# Patient Record
Sex: MALE | Race: WHITE | Employment: OTHER | URBAN - METROPOLITAN AREA
[De-identification: names, ages, dates, MRNs, and addresses within clinical notes are randomized per-mention and may not be internally consistent; named-entity substitution may affect disease eponyms.]

---

## 2018-10-17 ENCOUNTER — OFFICE VISIT (OUTPATIENT)
Dept: INTERNAL MEDICINE CLINIC | Age: 83
End: 2018-10-17

## 2018-10-17 VITALS
BODY MASS INDEX: 31.34 KG/M2 | WEIGHT: 206.8 LBS | HEIGHT: 68 IN | TEMPERATURE: 97.7 F | RESPIRATION RATE: 17 BRPM | DIASTOLIC BLOOD PRESSURE: 80 MMHG | HEART RATE: 51 BPM | SYSTOLIC BLOOD PRESSURE: 130 MMHG | OXYGEN SATURATION: 96 %

## 2018-10-17 DIAGNOSIS — J44.9 CHRONIC OBSTRUCTIVE PULMONARY DISEASE, UNSPECIFIED COPD TYPE (HCC): ICD-10-CM

## 2018-10-17 DIAGNOSIS — F32.0 CURRENT MILD EPISODE OF MAJOR DEPRESSIVE DISORDER WITHOUT PRIOR EPISODE (HCC): ICD-10-CM

## 2018-10-17 DIAGNOSIS — Z87.442 HISTORY OF KIDNEY STONES: ICD-10-CM

## 2018-10-17 DIAGNOSIS — F51.01 PRIMARY INSOMNIA: ICD-10-CM

## 2018-10-17 DIAGNOSIS — Z76.89 ENCOUNTER TO ESTABLISH CARE: Primary | ICD-10-CM

## 2018-10-17 DIAGNOSIS — Z86.79 HISTORY OF ATRIAL FIBRILLATION: ICD-10-CM

## 2018-10-17 DIAGNOSIS — Z87.438 HISTORY OF BPH: ICD-10-CM

## 2018-10-17 DIAGNOSIS — N18.30 STAGE 3 CHRONIC KIDNEY DISEASE (HCC): ICD-10-CM

## 2018-10-17 RX ORDER — TAMSULOSIN HYDROCHLORIDE 0.4 MG/1
0.4 CAPSULE ORAL DAILY
COMMUNITY
End: 2019-01-02 | Stop reason: SDUPTHER

## 2018-10-17 RX ORDER — ALPRAZOLAM 0.5 MG/1
0.5 TABLET ORAL
Qty: 30 TAB | Refills: 1 | OUTPATIENT
Start: 2018-10-17 | End: 2019-05-20

## 2018-10-17 RX ORDER — TRAZODONE HYDROCHLORIDE 100 MG/1
100 TABLET ORAL
Qty: 30 TAB | Refills: 1 | Status: SHIPPED | OUTPATIENT
Start: 2018-10-17 | End: 2019-01-02 | Stop reason: SDUPTHER

## 2018-10-17 RX ORDER — VENLAFAXINE 75 MG/1
25 TABLET ORAL 3 TIMES DAILY
COMMUNITY
End: 2018-10-17 | Stop reason: SDUPTHER

## 2018-10-17 RX ORDER — METOPROLOL SUCCINATE 25 MG/1
12.5 TABLET, EXTENDED RELEASE ORAL DAILY
Qty: 45 TAB | Refills: 0 | Status: SHIPPED | OUTPATIENT
Start: 2018-10-17 | End: 2019-01-02 | Stop reason: SDUPTHER

## 2018-10-17 RX ORDER — TRAZODONE HYDROCHLORIDE 100 MG/1
100 TABLET ORAL
COMMUNITY
End: 2018-10-17 | Stop reason: SDUPTHER

## 2018-10-17 RX ORDER — VENLAFAXINE 75 MG/1
75 TABLET ORAL 2 TIMES DAILY
Qty: 180 TAB | Refills: 0 | Status: SHIPPED | OUTPATIENT
Start: 2018-10-17 | End: 2019-01-02 | Stop reason: SDUPTHER

## 2018-10-17 RX ORDER — ALPRAZOLAM 0.5 MG/1
TABLET ORAL
COMMUNITY
End: 2018-10-17 | Stop reason: SDUPTHER

## 2018-10-17 RX ORDER — ASPIRIN 81 MG/1
TABLET ORAL DAILY
COMMUNITY
End: 2019-10-20

## 2018-10-17 RX ORDER — CHOLECALCIFEROL (VITAMIN D3) 125 MCG
10 CAPSULE ORAL
COMMUNITY

## 2018-10-17 RX ORDER — METOPROLOL SUCCINATE 25 MG/1
TABLET, EXTENDED RELEASE ORAL DAILY
COMMUNITY
End: 2018-10-17 | Stop reason: SDUPTHER

## 2018-10-17 RX ORDER — ALPRAZOLAM 0.5 MG/1
0.5 TABLET ORAL
Qty: 30 TAB | Refills: 1 | Status: SHIPPED | OUTPATIENT
Start: 2018-10-17 | End: 2018-10-17 | Stop reason: SDUPTHER

## 2018-10-17 RX ORDER — MULTIVITAMIN WITH IRON
1 TABLET ORAL DAILY
COMMUNITY

## 2018-10-17 NOTE — PROGRESS NOTES
New Patient Evaluation Felicitas Scott is a 81 y.o. male. They are here to establish care with the group and me as a primary care provider. He has recently moved from [place], [place]. His wife passed away 5 months ago and he moved to [place] to be closer to his daughter and sister. COPD:  Diagnosed with COPD. He uses a CPAP machine. Has had the diagnosis for the past 10 years. He has a remote history of smoking. Htn:  Pressures historically \"a little high\". He had an atrial fibrillation event 10 years ago. He was placed on toprol. Controlled. Stage 3 chronic kidney disease- stable. He also has had recurrent kidney stones (last 5 years ago). He had a TURP nearly 30 years ago. BPH. He had a history of pseudomonas in his urine. This was seen on a physical 5 years ago. He has had chronic depression. He has seen counseling in the past.  He has been on Effexor. Also has been on Xanax for the past 3-4 years. He has taken this at bedtime for the past 3-4 years. This was suggested by a psychiatrist but this was continued by his primary care Colonoscopy was done earlier this year- Normal.  Plan for 5 year follow up. There are no active problems to display for this patient. Current Outpatient Medications Medication Sig Dispense Refill  metoprolol succinate (TOPROL-XL) 25 mg XL tablet Take  by mouth daily.  tamsulosin (FLOMAX) 0.4 mg capsule Take 0.4 mg by mouth daily.  venlafaxine (EFFEXOR) 75 mg tablet Take 25 mg by mouth three (3) times daily.  traZODone (DESYREL) 100 mg tablet Take 100 mg by mouth nightly.  ALPRAZolam (XANAX) 0.5 mg tablet Take  by mouth.  melatonin tab tablet Take  by mouth nightly.  multivitamin with iron tablet Take 1 Tab by mouth daily.  aspirin delayed-release 81 mg tablet Take  by mouth daily.  AZO CRANBERRY 641-32-69 mg-mg-million tab Take  by mouth. No Known Allergies No past medical history on file. Past Surgical History:  
Procedure Laterality Date  HX COLONOSCOPY    HX LITHOTRIPSY  HX PROSTATECTOMY Family History Problem Relation Age of Onset  Hypertension Mother   
     cerebral hemmorage  Stroke Mother  Prostate Cancer Father Social History Tobacco Use  Smoking status: Former Smoker Last attempt to quit: 2000 Years since quittin.8  Smokeless tobacco: Never Used Substance Use Topics  Alcohol use: Yes Drinks per session: 1 or 2 Binge frequency: Weekly Health Maintenance Topic Date Due  
 DTaP/Tdap/Td series (1 - Tdap) 1957  Shingrix Vaccine Age 50> (1 of 2) 1986  GLAUCOMA SCREENING Q2Y  2001  Pneumococcal 65+ Low/Medium Risk (1 of 2 - PCV13) 2001  Influenza Age 5 to Adult  2018 Review of Systems Constitutional: Negative. Respiratory: Negative. Cardiovascular: Negative. Psychiatric/Behavioral: Positive for depression. Negative for suicidal ideas. Visit Vitals /80 (BP 1 Location: Right arm, BP Patient Position: Sitting) Pulse (!) 51 Temp 97.7 °F (36.5 °C) (Oral) Resp 17 Ht 5' 8.35\" (1.736 m) Wt 206 lb 12.8 oz (93.8 kg) SpO2 96% BMI 31.13 kg/m² Physical Exam  
Constitutional: No distress. Cardiovascular: Normal rate and regular rhythm. Pulmonary/Chest: Effort normal and breath sounds normal.  
 
 
 
 
ASSESSMENT/PLAN Diagnoses and all orders for this visit: 
 
Encounter to establish care History of atrial fibrillation 
-     metoprolol succinate (TOPROL-XL) 25 mg XL tablet; Take 0.5 Tabs by mouth daily for 90 days. 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 
-     venlafaxine (EFFEXOR) 75 mg tablet; Take 1 Tab by mouth two (2) times a day. Stage 3 chronic kidney disease ([address] -     CBC WITH AUTOMATED DIFF 
-     METABOLIC PANEL, COMPREHENSIVE 
-     LIPID PANEL 
-     MICROALBUMIN, UR, RAND W/ MICROALB/CREAT RATIO 
-     REFERRAL TO NEPHROLOGY Primary insomnia 
-     TSH 3RD GENERATION 
-     traZODone (DESYREL) 100 mg tablet; Take 1 Tab by mouth nightly. 
-     REFERRAL TO SLEEP STUDIES History of kidney stones 
-     REFERRAL TO UROLOGY History of BPH 
-     PSA, DIAGNOSTIC (PROSTATE SPECIFIC AG) He will return in February for a Medicare Wellness visit.  
 
-Discussed with the patient to continue the current plan of care. We will obtain baseline labwork and determine if any adjustments need to be done. We will also await the records of the previous PCP to ascertain further details of the patient's history. The patient agrees with and understands the plan of care. All questions have been answered.

## 2018-10-18 ENCOUNTER — HOSPITAL ENCOUNTER (OUTPATIENT)
Dept: LAB | Age: 83
Discharge: HOME | End: 2018-10-18
Payer: MEDICARE

## 2018-10-18 PROCEDURE — 36415 COLL VENOUS BLD VENIPUNCTURE: CPT

## 2018-10-18 PROCEDURE — 84443 ASSAY THYROID STIM HORMONE: CPT

## 2018-10-18 PROCEDURE — 82043 UR ALBUMIN QUANTITATIVE: CPT

## 2018-10-18 PROCEDURE — 80053 COMPREHEN METABOLIC PANEL: CPT

## 2018-10-18 PROCEDURE — 85025 COMPLETE CBC W/AUTO DIFF WBC: CPT

## 2018-10-18 PROCEDURE — 84153 ASSAY OF PSA TOTAL: CPT

## 2018-10-18 PROCEDURE — 80061 LIPID PANEL: CPT

## 2018-10-19 LAB
ALBUMIN SERPL-MCNC: 3.9 G/DL (ref 3.5–4.7)
ALBUMIN/CREAT UR: 56.7 MG/G CREAT (ref 0–30)
ALBUMIN/GLOB SERPL: 1.4 {RATIO} (ref 1.2–2.2)
ALP SERPL-CCNC: 60 IU/L (ref 39–117)
ALT SERPL-CCNC: 16 IU/L (ref 0–44)
AST SERPL-CCNC: 22 IU/L (ref 0–40)
BASOPHILS # BLD AUTO: 0 X10E3/UL (ref 0–0.2)
BASOPHILS NFR BLD AUTO: 0 %
BILIRUB SERPL-MCNC: 0.6 MG/DL (ref 0–1.2)
BUN SERPL-MCNC: 31 MG/DL (ref 8–27)
BUN/CREAT SERPL: 19 (ref 10–24)
CALCIUM SERPL-MCNC: 9.7 MG/DL (ref 8.6–10.2)
CHLORIDE SERPL-SCNC: 106 MMOL/L (ref 96–106)
CHOLEST SERPL-MCNC: 159 MG/DL (ref 100–199)
CO2 SERPL-SCNC: 26 MMOL/L (ref 20–29)
CREAT SERPL-MCNC: 1.6 MG/DL (ref 0.76–1.27)
CREAT UR-MCNC: 95.3 MG/DL
EOSINOPHIL # BLD AUTO: 0.2 X10E3/UL (ref 0–0.4)
EOSINOPHIL NFR BLD AUTO: 3 %
ERYTHROCYTE [DISTWIDTH] IN BLOOD BY AUTOMATED COUNT: 13.6 % (ref 12.3–15.4)
GLOBULIN SER CALC-MCNC: 2.7 G/DL (ref 1.5–4.5)
GLUCOSE SERPL-MCNC: 108 MG/DL (ref 65–99)
HCT VFR BLD AUTO: 46.2 % (ref 37.5–51)
HDLC SERPL-MCNC: 44 MG/DL
HGB BLD-MCNC: 15.8 G/DL (ref 13–17.7)
IMM GRANULOCYTES # BLD: 0 X10E3/UL (ref 0–0.1)
IMM GRANULOCYTES NFR BLD: 0 %
LDLC SERPL CALC-MCNC: 90 MG/DL (ref 0–99)
LYMPHOCYTES # BLD AUTO: 1.7 X10E3/UL (ref 0.7–3.1)
LYMPHOCYTES NFR BLD AUTO: 28 %
MCH RBC QN AUTO: 30.6 PG (ref 26.6–33)
MCHC RBC AUTO-ENTMCNC: 34.2 G/DL (ref 31.5–35.7)
MCV RBC AUTO: 90 FL (ref 79–97)
MICROALBUMIN UR-MCNC: 54 UG/ML
MONOCYTES # BLD AUTO: 0.6 X10E3/UL (ref 0.1–0.9)
MONOCYTES NFR BLD AUTO: 10 %
NEUTROPHILS # BLD AUTO: 3.6 X10E3/UL (ref 1.4–7)
NEUTROPHILS NFR BLD AUTO: 59 %
PLATELET # BLD AUTO: 136 X10E3/UL (ref 150–379)
POTASSIUM SERPL-SCNC: 4.8 MMOL/L (ref 3.5–5.2)
PROT SERPL-MCNC: 6.6 G/DL (ref 6–8.5)
PSA SERPL-MCNC: 2.1 NG/ML (ref 0–4)
RBC # BLD AUTO: 5.16 X10E6/UL (ref 4.14–5.8)
SODIUM SERPL-SCNC: 144 MMOL/L (ref 134–144)
TRIGL SERPL-MCNC: 124 MG/DL (ref 0–149)
TSH SERPL DL<=0.005 MIU/L-ACNC: 1.21 UIU/ML (ref 0.45–4.5)
VLDLC SERPL CALC-MCNC: 25 MG/DL (ref 5–40)
WBC # BLD AUTO: 6 X10E3/UL (ref 3.4–10.8)

## 2018-11-01 ENCOUNTER — TELEPHONE (OUTPATIENT)
Dept: INTERNAL MEDICINE CLINIC | Age: 83
End: 2018-11-01

## 2018-11-01 NOTE — TELEPHONE ENCOUNTER
Patient experiencing dull chest pain on the right upper side x 2-3 months. Started random, has worsen. Patient denies SOB, palpitations, trouble breathing. He refused sooner appointment with another provider. He stated he is ok. Advised if pain worsen or experience trouble breathing go to ER. Pt scheduled on 11/05/2018 @ 3:30pm Patient verbalized understanding.

## 2018-11-05 ENCOUNTER — OFFICE VISIT (OUTPATIENT)
Dept: INTERNAL MEDICINE CLINIC | Age: 83
End: 2018-11-05

## 2018-11-05 VITALS
OXYGEN SATURATION: 97 % | DIASTOLIC BLOOD PRESSURE: 70 MMHG | TEMPERATURE: 98 F | WEIGHT: 206.4 LBS | SYSTOLIC BLOOD PRESSURE: 109 MMHG | BODY MASS INDEX: 31.28 KG/M2 | HEART RATE: 58 BPM | RESPIRATION RATE: 17 BRPM | HEIGHT: 68 IN

## 2018-11-05 DIAGNOSIS — R07.89 CHEST WALL PAIN: ICD-10-CM

## 2018-11-05 DIAGNOSIS — R07.9 CHEST PAIN, UNSPECIFIED TYPE: Primary | ICD-10-CM

## 2018-11-05 DIAGNOSIS — R10.13 EPIGASTRIC PAIN: ICD-10-CM

## 2018-11-05 NOTE — PROGRESS NOTES
Pt is concerned about a dull pain to right chest area radiating to his back for the past 4 months progressively getting worse.

## 2018-11-06 ENCOUNTER — HOSPITAL ENCOUNTER (OUTPATIENT)
Dept: GENERAL RADIOLOGY | Age: 83
Discharge: HOME | End: 2018-11-06
Payer: MEDICARE

## 2018-11-06 DIAGNOSIS — R10.13 EPIGASTRIC PAIN: ICD-10-CM

## 2018-11-06 DIAGNOSIS — R07.9 CHEST PAIN, UNSPECIFIED TYPE: ICD-10-CM

## 2018-11-06 PROCEDURE — 74018 RADEX ABDOMEN 1 VIEW: CPT

## 2018-11-06 PROCEDURE — 71046 X-RAY EXAM CHEST 2 VIEWS: CPT

## 2018-12-07 NOTE — PROGRESS NOTES
Acute Care Note    Hossein Bunch is 81 y.o. male. he presents for evaluation of Chest Pain and Cough      The patient presents for complaints of right-sided chest discomfort. He notes that this is been present for the previous 5-6 days. It is localized to the right chest.  He denies increased pain on exertion. He denies sweating or significant shortness of breath. Prior to Admission medications    Medication Sig Start Date End Date Taking? Authorizing Provider   tamsulosin (FLOMAX) 0.4 mg capsule Take 0.4 mg by mouth daily. Yes Provider, Historical   melatonin tab tablet Take  by mouth nightly. Yes Provider, Historical   multivitamin with iron tablet Take 1 Tab by mouth daily. Yes Provider, Historical   aspirin delayed-release 81 mg tablet Take  by mouth daily. Yes Provider, Historical   Evert Drone 874-84-53 mg-mg-million tab Take  by mouth. Yes Provider, Historical   metoprolol succinate (TOPROL-XL) 25 mg XL tablet Take 0.5 Tabs by mouth daily for 90 days. 10/17/18 1/15/19 Yes Jennifer Suggs MD   [facility]) 75 mg tablet Take 1 Tab by mouth two (2) times a day. 10/17/18  Yes Jennifer Suggs MD   traZODone (DESYREL) 100 mg tablet Take 1 Tab by mouth nightly. 10/17/18  Yes Jennifer Suggs MD   ALPRAZolam [facility]) 0.5 mg tablet Take 1 Tab by mouth nightly as needed for Anxiety. Max Daily Amount: 0.5 mg. 10/17/18  Yes Jennifer Suggs MD         There is no problem list on file for this patient. Review of Systems   Constitutional: Negative for chills and fever. Cardiovascular: Positive for chest pain (as per hpi). Visit Vitals  /70 (BP 1 Location: Right arm, BP Patient Position: Sitting)   Pulse (!) 58   Temp 98 °F (36.7 °C) (Oral)   Resp 17   Ht 5' 8.35\" (1.736 m)   Wt 206 lb 6.4 oz (93.6 kg)   SpO2 97%   BMI 31.06 kg/m²       Physical Exam   Constitutional: No distress. Cardiovascular: Normal rate and regular rhythm.    Pulmonary/Chest: Effort normal and breath sounds normal.   Abdominal: Soft. He exhibits no distension. There is no tenderness. ASSESSMENT/PLAN  Diagnoses and all orders for this visit:    1. Chest pain, unspecified type  -     AMB POC EKG ROUTINE W/ 12 LEADS, INTER & REP  -     XR CHEST PA LAT; Future    2. Chest wall pain    3. Epigastric pain  -     XR ABD (KUB); Future         Advised the patient to call back or return to office if symptoms worsen/change/persist.   Discussed expected course/resolution/complications of diagnosis in detail with patient. Medication risks/benefits/costs/interactions/alternatives discussed with patient. The patient was given an after visit summary which includes diagnoses, current medications, & vitals. They expressed understanding with the diagnosis and plan.

## 2019-01-18 LAB — CREATININE, EXTERNAL: 1.58

## 2019-02-04 ENCOUNTER — OFFICE VISIT (OUTPATIENT)
Dept: SLEEP MEDICINE | Age: 84
End: 2019-02-04

## 2019-02-04 VITALS
DIASTOLIC BLOOD PRESSURE: 96 MMHG | SYSTOLIC BLOOD PRESSURE: 160 MMHG | BODY MASS INDEX: 32.65 KG/M2 | WEIGHT: 208 LBS | OXYGEN SATURATION: 94 % | HEART RATE: 66 BPM | HEIGHT: 67 IN

## 2019-02-04 DIAGNOSIS — G47.33 OSA (OBSTRUCTIVE SLEEP APNEA): Primary | ICD-10-CM

## 2019-02-04 NOTE — PATIENT INSTRUCTIONS

## 2019-02-04 NOTE — PROGRESS NOTES
[address] [facility] [place]. [place], [address]  Tel.  [phone]  Fax. [address] [facility] [address]  [place], [address] [facility]  Tel.  [phone]  Fax. [phone] [address] [facility]  Tel.  [phone]  Fax. [phone]       Chief Complaint       Chief Complaint   Patient presents with    Sleep Problem     NP refd by Dr. Rogelio Hernandez for YULISSA eval.       HPI      Talon Jones is a  81y.o. year old male seen for evaluation of a sleep disorder  . He notes having had an initial evaluation over 20 years ago when he was living in [place]. He moved to [place] to be near family following the death of his wife. He does have a history of difficulty with sleep initiation and has been taking trazodone, melatonin and St. Johns wort. He has not been replacing his unit or supplies with any consistency. He notes that he wants to stop using CPAP because he \"hates it\". He notes that he has nasal pillows; he does not use humidifier. Despite this he has difficulty with condensation. He will go to bed between 9-10 PM and often awakens at 3: 30 a.m. He will often be unable to return to sleep until 5: 30.  He may get up and sit at the computer for a while and will try to return to sleep between 8-9 AM.    Compliance data demonstrated CPAP 10 cm use during 29/30 days with the average daily use of 4.25 hours. Mask leak elevated. CMS compliance criteria 53%. Average AHI 2.3/h. The patient has not undergone recent diagnostic testing for the current problems. [place] Sleepiness Score: 7       No Known Allergies    Current Outpatient Medications   Medication Sig Dispense Refill    metoprolol succinate (TOPROL-XL) 25 mg XL tablet Take 0.5 Tabs by mouth daily for 90 days. 45 Tab 0    venlafaxine (EFFEXOR) 75 mg tablet Take 1 Tab by mouth two (2) times a day. 180 Tab 0    traZODone (DESYREL) 100 mg tablet Take 1 Tab by mouth nightly.  90 Tab 0    tamsulosin (FLOMAX) 0.4 mg capsule Take 1 Cap by mouth daily. 90 Cap 1    melatonin tab tablet Take  by mouth nightly.  multivitamin with iron tablet Take 1 Tab by mouth daily.  aspirin delayed-release 81 mg tablet Take  by mouth daily.  AZO CRANBERRY 601-28-45 mg-mg-million tab Take  by mouth.  ALPRAZolam (XANAX) 0.5 mg tablet Take 1 Tab by mouth nightly as needed for Anxiety. Max Daily Amount: 0.5 mg. 30 Tab 1        He  has no past medical history on file. He  has a past surgical history that includes hx prostatectomy; hx lithotripsy; and hx colonoscopy (2018). He family history includes Hypertension in his mother; Prostate Cancer in his father; Stroke in his mother. He  reports that he quit smoking about 19 years ago. he has never used smokeless tobacco. He reports that he drinks alcohol. He reports that he does not use drugs. Review of Systems:  Review of Systems   Constitutional: Negative for chills and fever. HENT: Positive for tinnitus. Negative for hearing loss. Eyes: Negative for blurred vision and double vision. Respiratory: Negative for cough and wheezing. Cardiovascular: Negative for chest pain. Gastrointestinal: Negative for abdominal pain and heartburn. Genitourinary: Negative for frequency and urgency. Musculoskeletal: Negative for back pain and neck pain. Skin: Negative for itching and rash. Neurological: Negative for dizziness and headaches. Psychiatric/Behavioral: Negative for depression. Objective:     Visit Vitals  BP (!) 160/96 (BP 1 Location: Left arm, BP Patient Position: Sitting)   Pulse 66   Ht 5' 7\" (1.702 m)   Wt 208 lb (94.3 kg)   SpO2 94%   BMI 32.58 kg/m²     Body mass index is 32.58 kg/m². General:   Conversant, cooperative   Eyes:  Pupils equal and reactive, no nystagmus   Oropharynx:   Mallampati score I, tongue normal   Tonsils:   t   Neck:   No carotid bruits; Neck circ.  in \"inches\": 14   Chest/Lungs:  Clear on auscultation    CVS:  Normal rate, regular rhythm   Skin:  Warm to touch; no obvious rashes   Neuro:  Speech fluent, face symmetrical, tongue movement normal   Psych:  Normal affect,  normal countenance        Assessment:       ICD-10-CM ICD-9-CM    1. YULISSA (obstructive sleep apnea) G47.33 327.23      History of sleep disordered breathing; on 10 cm CPAP with reduced usage he will be reevaluated with a sleep test.  Results will be reviewed with him and decision regarding treatment options made at that time. Plan:     No orders of the defined types were placed in this encounter. * Patient has a history and examination consistent with the diagnosis of sleep apnea. * Sleep testing was ordered reevaluation. * He was provided information on sleep apnea including corresponding risk factors and the importance of proper treatment. * Treatment options if indicated were reviewed today. Instructions:  o Do not engage in activities requiring a normal degree of alertness if fatigue is present. o The patient understands that untreated or undertreated sleep apnea could impair judgement and the ability to function normally during the day.  o Call or return if symptoms worsen or persist.          Rashida Bustamante MD, FAASM  Electronically signed 02/04/19       This note was created using voice recognition software. Despite editing, there may be syntax errors. This note will not be viewable in [address]

## 2019-02-05 ENCOUNTER — HOSPITAL ENCOUNTER (OUTPATIENT)
Dept: SLEEP MEDICINE | Age: 84
Discharge: HOME | End: 2019-02-05
Payer: MEDICARE

## 2019-02-05 VITALS
DIASTOLIC BLOOD PRESSURE: 83 MMHG | OXYGEN SATURATION: 95 % | BODY MASS INDEX: 32.68 KG/M2 | HEIGHT: 67 IN | WEIGHT: 208.2 LBS | SYSTOLIC BLOOD PRESSURE: 176 MMHG | HEART RATE: 73 BPM | TEMPERATURE: 98.2 F

## 2019-02-05 DIAGNOSIS — G47.33 OSA (OBSTRUCTIVE SLEEP APNEA): ICD-10-CM

## 2019-02-05 PROCEDURE — 95810 POLYSOM 6/> YRS 4/> PARAM: CPT | Performed by: SPECIALIST

## 2019-02-20 ENCOUNTER — OFFICE VISIT (OUTPATIENT)
Dept: INTERNAL MEDICINE CLINIC | Age: 84
End: 2019-02-20

## 2019-02-20 ENCOUNTER — HOSPITAL ENCOUNTER (OUTPATIENT)
Dept: LAB | Age: 84
Discharge: HOME | End: 2019-02-20
Payer: MEDICARE

## 2019-02-20 VITALS
HEART RATE: 70 BPM | SYSTOLIC BLOOD PRESSURE: 148 MMHG | HEIGHT: 67 IN | BODY MASS INDEX: 31.91 KG/M2 | DIASTOLIC BLOOD PRESSURE: 90 MMHG | RESPIRATION RATE: 18 BRPM | WEIGHT: 203.3 LBS | TEMPERATURE: 98.3 F | OXYGEN SATURATION: 94 %

## 2019-02-20 DIAGNOSIS — Z86.79 HISTORY OF ATRIAL FIBRILLATION: ICD-10-CM

## 2019-02-20 DIAGNOSIS — Z23 ENCOUNTER FOR IMMUNIZATION: ICD-10-CM

## 2019-02-20 DIAGNOSIS — F51.01 PRIMARY INSOMNIA: ICD-10-CM

## 2019-02-20 DIAGNOSIS — N39.0 RECURRENT UTI: ICD-10-CM

## 2019-02-20 DIAGNOSIS — Z00.00 MEDICARE ANNUAL WELLNESS VISIT, SUBSEQUENT: Primary | ICD-10-CM

## 2019-02-20 DIAGNOSIS — R41.3 MEMORY LOSS: ICD-10-CM

## 2019-02-20 DIAGNOSIS — F32.0 CURRENT MILD EPISODE OF MAJOR DEPRESSIVE DISORDER WITHOUT PRIOR EPISODE (HCC): ICD-10-CM

## 2019-02-20 LAB
BILIRUB UR QL STRIP: NEGATIVE
GLUCOSE UR-MCNC: NEGATIVE MG/DL
KETONES P FAST UR STRIP-MCNC: NEGATIVE MG/DL
PH UR STRIP: 6.5 [PH] (ref 4.6–8)
PROT UR QL STRIP: NORMAL
SP GR UR STRIP: 1.02 (ref 1–1.03)
UA UROBILINOGEN AMB POC: NORMAL (ref 0.2–1)
URINALYSIS CLARITY POC: NORMAL
URINALYSIS COLOR POC: YELLOW
URINE BLOOD POC: NORMAL
URINE LEUKOCYTES POC: NORMAL
URINE NITRITES POC: POSITIVE

## 2019-02-20 PROCEDURE — 87086 URINE CULTURE/COLONY COUNT: CPT

## 2019-02-20 PROCEDURE — 87186 SC STD MICRODIL/AGAR DIL: CPT

## 2019-02-20 PROCEDURE — 87088 URINE BACTERIA CULTURE: CPT

## 2019-02-20 PROCEDURE — 87077 CULTURE AEROBIC IDENTIFY: CPT

## 2019-02-20 RX ORDER — METOPROLOL SUCCINATE 25 MG/1
12.5 TABLET, EXTENDED RELEASE ORAL DAILY
Qty: 45 TAB | Refills: 1 | Status: SHIPPED | OUTPATIENT
Start: 2019-02-20 | End: 2019-07-09 | Stop reason: SDUPTHER

## 2019-02-20 RX ORDER — VENLAFAXINE 75 MG/1
75 TABLET ORAL 2 TIMES DAILY
Qty: 180 TAB | Refills: 1 | Status: SHIPPED | OUTPATIENT
Start: 2019-02-20 | End: 2019-07-09 | Stop reason: SDUPTHER

## 2019-02-20 RX ORDER — TRAZODONE HYDROCHLORIDE 100 MG/1
100 TABLET ORAL
Qty: 90 TAB | Refills: 1 | Status: SHIPPED | OUTPATIENT
Start: 2019-02-20 | End: 2019-07-09 | Stop reason: SDUPTHER

## 2019-02-20 NOTE — PROGRESS NOTES
3 most recent PHQ Screens 2/20/2019   Little interest or pleasure in doing things Nearly every day   Feeling down, depressed, irritable, or hopeless Not at all   Total Score PHQ 2 3   Trouble falling or staying asleep, or sleeping too much Not at all   Feeling tired or having little energy Not at all   Poor appetite, weight loss, or overeating Not at all   Feeling bad about yourself - or that you are a failure or have let yourself or your family down Not at all   Trouble concentrating on things such as school, work, reading, or watching TV Not at all   Moving or speaking so slowly that other people could have noticed; or the opposite being so fidgety that others notice Not at all   Thoughts of being better off dead, or hurting yourself in some way Not at all   PHQ 9 Score 3   How difficult have these problems made it for you to do your work, take care of your home and get along with others Not difficult at all

## 2019-02-20 NOTE — PROGRESS NOTES
This is a Subsequent Medicare Annual Wellness Visit providing Personalized Prevention Plan Services (PPPS) (Performed 12 months after initial AWV and PPPS )    I have reviewed the patient's medical history in detail and updated the computerized patient record. He has had an issue with memory recently. He notes forgetting some things randomly during the day (mainly short term memory). He has seen Sleep medicine recenlty. There is a planned follow up  He will have this later this week. History   History reviewed. No pertinent past medical history. Past Surgical History:   Procedure Laterality Date    HX COLONOSCOPY      HX LITHOTRIPSY      HX PROSTATECTOMY       Current Outpatient Medications   Medication Sig Dispense Refill    VALERIAN ROOT by Does Not Apply route.  metoprolol succinate (TOPROL-XL) 25 mg XL tablet Take 0.5 Tabs by mouth daily for 90 days. 45 Tab 0    tamsulosin (FLOMAX) 0.4 mg capsule Take 1 Cap by mouth daily. 90 Cap 1    venlafaxine (EFFEXOR) 75 mg tablet Take 1 Tab by mouth two (2) times a day. 180 Tab 0    traZODone (DESYREL) 100 mg tablet Take 1 Tab by mouth nightly. 90 Tab 0    melatonin tab tablet Take  by mouth nightly.  multivitamin with iron tablet Take 1 Tab by mouth daily.  aspirin delayed-release 81 mg tablet Take  by mouth daily.  AZO CRANBERRY 424-64-33 mg-mg-million tab Take  by mouth.  ALPRAZolam (XANAX) 0.5 mg tablet Take 1 Tab by mouth nightly as needed for Anxiety.  Max Daily Amount: 0.5 mg. 30 Tab 1     No Known Allergies  Family History   Problem Relation Age of Onset    Hypertension Mother         cerebral hemmorage     Stroke Mother     Prostate Cancer Father      Social History     Tobacco Use    Smoking status: Former Smoker     Last attempt to quit: 2000     Years since quittin.1    Smokeless tobacco: Never Used   Substance Use Topics    Alcohol use: Yes     Drinks per session: 1 or 2     Binge frequency: Weekly     There is no problem list on file for this patient. Depression Risk Factor Screening:     3 most recent PHQ Screens 2/20/2019   Little interest or pleasure in doing things Nearly every day   Feeling down, depressed, irritable, or hopeless Not at all   Total Score PHQ 2 3   Trouble falling or staying asleep, or sleeping too much Not at all   Feeling tired or having little energy Not at all   Poor appetite, weight loss, or overeating Not at all   Feeling bad about yourself - or that you are a failure or have let yourself or your family down Not at all   Trouble concentrating on things such as school, work, reading, or watching TV Not at all   Moving or speaking so slowly that other people could have noticed; or the opposite being so fidgety that others notice Not at all   Thoughts of being better off dead, or hurting yourself in some way Not at all   PHQ 9 Score 3   How difficult have these problems made it for you to do your work, take care of your home and get along with others Not difficult at all     Alcohol Risk Factor Screening: You do not drink alcohol or very rarely. Functional Ability and Level of Safety:     Hearing Loss   Hearing is good. Activities of Daily Living   Self-care. Requires assistance with: no ADLs    Fall Risk     Fall Risk Assessment, last 12 mths 10/17/2018   Able to walk? Yes   Fall in past 12 months? No     Abuse Screen   Patient is not abused    Review of Systems   A comprehensive review of systems was negative except for that written in the HPI.     Physical Examination     Evaluation of Cognitive Function:  Mood/affect:  happy  Appearance: age appropriate  Family member/caregiver input: n/a    Visit Vitals  /90 (BP 1 Location: Right arm, BP Patient Position: Sitting)   Pulse 70   Temp 98.3 °F (36.8 °C) (Oral)   Resp 18   Ht 5' 7\" (1.702 m)   Wt 203 lb 4.8 oz (92.2 kg)   SpO2 94%   BMI 31.84 kg/m²     General appearance: alert, cooperative, no distress, appears stated age  Lungs: clear to auscultation bilaterally  Heart: regular rate and rhythm, S1, S2 normal, no murmur, click, rub or gallop    Patient Care Team:  Jovon Garcia MD as PCP - General (Internal Medicine)    Advice/Referrals/Counseling   Education and counseling provided:  Are appropriate based on today's review and evaluation      Assessment/Plan   Diagnoses and all orders for this visit:    1. Medicare annual wellness visit, subsequent    2. History of atrial fibrillation  -     metoprolol succinate (TOPROL-XL) 25 mg XL tablet; Take 0.5 Tabs by mouth daily for 90 days. 3. Current mild episode of major depressive disorder without prior episode (HCC)  -     venlafaxine (EFFEXOR) 75 mg tablet; Take 1 Tab by mouth two (2) times a day. 4. Primary insomnia  -     traZODone (DESYREL) 100 mg tablet; Take 1 Tab by mouth nightly. 5. Encounter for immunization  -     pneumococcal 13 malaika conj dip (PREVNAR-13) 0.5 mL syrg injection; 0.5 mL by IntraMUSCular route once for 1 dose.  -     diphtheria-pertussis, acellular,-tetanus (BOOSTRIX TDAP) 2.5-8-5 Lf-mcg-Lf/0.5mL susp susp; 0.5 mL by IntraMUSCular route once for 1 dose.  -     varicella-zoster recombinant, PF, (SHINGRIX, PF,) 50 mcg/0.5 mL susr injection; 0.5 mL by IntraMUSCular route once for 1 dose. 6. Memory loss  -     REFERRAL TO NEUROLOGY    7. Recurrent UTI  -     AMB POC URINALYSIS DIP STICK AUTO W/O MICRO  -     CULTURE, URINE      Follow-up Disposition:  Return in about 6 months (around 8/20/2019) for Follow up. Ruperto Knight

## 2019-02-23 LAB
BACTERIA UR CULT: ABNORMAL
BACTERIA UR CULT: ABNORMAL

## 2019-02-25 ENCOUNTER — OFFICE VISIT (OUTPATIENT)
Dept: SLEEP MEDICINE | Age: 84
End: 2019-02-25

## 2019-02-25 VITALS
WEIGHT: 206 LBS | DIASTOLIC BLOOD PRESSURE: 84 MMHG | SYSTOLIC BLOOD PRESSURE: 144 MMHG | OXYGEN SATURATION: 94 % | BODY MASS INDEX: 32.33 KG/M2 | HEART RATE: 63 BPM | HEIGHT: 67 IN

## 2019-02-25 DIAGNOSIS — G47.33 OSA (OBSTRUCTIVE SLEEP APNEA): Primary | ICD-10-CM

## 2019-02-25 NOTE — PROGRESS NOTES
[address] [place]. [place], [address]  Tel.  [phone]  Fax. 100 [facility] 60  [place], [address] [facility]  Tel.  [phone]  Fax. [phone] [address] [facility] Joaquim Kraus   Tel.  [phone]  Fax. [phone]         Chief Complaint       Chief Complaint   Patient presents with    Sleep Problem     results         MILLIE Patiño is a  81y.o. year old male seen for follow-up. He noted this time and so having had an initial evaluation over 20 years ago when he was living in [place]. He moved to [facility] to be near family following the death of his wife. He does have a history of difficulty with sleep initiation and has been taking trazodone, melatonin and St. Johns wort. He has not been replacing his unit or supplies with any consistency.     He noted that he wanted to say to stop using CPAP because he \"hates it\". Possibly    He was evaluated with a sleep study demonstrating borderline AHI of 6/h; events were more prominent supine. Study was reviewed in detail with him today. No Known Allergies    Current Outpatient Medications   Medication Sig Dispense Refill    VALERIAN ROOT by Does Not Apply route.  metoprolol succinate (TOPROL-XL) 25 mg XL tablet Take 0.5 Tabs by mouth daily for 90 days. 45 Tab 1    venlafaxine (EFFEXOR) 75 mg tablet Take 1 Tab by mouth two (2) times a day. 180 Tab 1    traZODone (DESYREL) 100 mg tablet Take 1 Tab by mouth nightly. 90 Tab 1    tamsulosin (FLOMAX) 0.4 mg capsule Take 1 Cap by mouth daily. 90 Cap 1    melatonin tab tablet Take  by mouth nightly.  multivitamin with iron tablet Take 1 Tab by mouth daily.  aspirin delayed-release 81 mg tablet Take  by mouth daily.  AZO CRANBERRY 060-99-64 mg-mg-million tab Take  by mouth.  ALPRAZolam (XANAX) 0.5 mg tablet Take 1 Tab by mouth nightly as needed for Anxiety.  Max Daily Amount: 0.5 mg. 30 Tab 1        He  has no past medical history on file. He  has a past surgical history that includes hx prostatectomy; hx lithotripsy; and hx colonoscopy (2018). He family history includes Hypertension in his mother; Prostate Cancer in his father; Stroke in his mother. He  reports that he quit smoking about 19 years ago. he has never used smokeless tobacco. He reports that he drinks alcohol. He reports that he does not use drugs. Review of Systems:  Unchanged per patient      Objective:     Visit Vitals  /84 (BP 1 Location: Left arm, BP Patient Position: Sitting)   Pulse 63   Ht 5' 7\" (1.702 m)   Wt 206 lb (93.4 kg)   SpO2 94%   BMI 32.26 kg/m²     Body mass index is 32.26 kg/m². Assessment:       ICD-10-CM ICD-9-CM    1. YULISSA (obstructive sleep apnea) G47.33 327.23      Mild sleep disordered breathing with events primarily supine. He would benefit from positional therapy which was reviewed. He will contact the office if he has difficulty limiting supine sleep. Potential benefit of an oral appliance was discussed. Plan:   No orders of the defined types were placed in this encounter. * Patient has a history and examination consistent with the diagnosis of sleep apnea. * Sleep testing was reviewed  * He was provided information on positional therapy for sleep apnea   * Treatment options if indicated were reviewed today. Jory Burleson MD, FAASM  Electronically signed 02/25/19        This note was created using voice recognition software. Despite editing, there may be syntax errors. This note will not be viewable in [address]

## 2019-02-25 NOTE — PATIENT INSTRUCTIONS

## 2019-02-28 NOTE — PROGRESS NOTES
Please call the patient,    He has evidence of E. coli in his urine along with Pseudomonas. Is he symptomatic at this time? The urine E.coli population is small. If so, I would like to start him on an antibiotic. I would give him Cefuroxime 500mg BID for the next 7 days. He also needs to see urology. If he has not yet, he needs to make an appointment with them.

## 2019-03-01 ENCOUNTER — TELEPHONE (OUTPATIENT)
Dept: INTERNAL MEDICINE CLINIC | Age: 84
End: 2019-03-01

## 2019-03-21 ENCOUNTER — HOSPITAL ENCOUNTER (OUTPATIENT)
Dept: INTERVENTIONAL RADIOLOGY/VASCULAR | Age: 84
Discharge: HOME | End: 2019-03-21
Attending: UROLOGY | Admitting: RADIOLOGY
Payer: MEDICARE

## 2019-03-21 VITALS
RESPIRATION RATE: 18 BRPM | OXYGEN SATURATION: 99 % | SYSTOLIC BLOOD PRESSURE: 133 MMHG | BODY MASS INDEX: 28.63 KG/M2 | HEIGHT: 70 IN | DIASTOLIC BLOOD PRESSURE: 67 MMHG | TEMPERATURE: 98.2 F | HEART RATE: 78 BPM | WEIGHT: 200 LBS

## 2019-03-21 DIAGNOSIS — N39.0 RECURRENT UTI: ICD-10-CM

## 2019-03-21 PROCEDURE — 76937 US GUIDE VASCULAR ACCESS: CPT

## 2019-03-21 PROCEDURE — C1751 CATH, INF, PER/CENT/MIDLINE: HCPCS

## 2019-03-21 PROCEDURE — 74011250636 HC RX REV CODE- 250/636: Performed by: RADIOLOGY

## 2019-03-21 RX ORDER — LIDOCAINE HYDROCHLORIDE 10 MG/ML
10 INJECTION, SOLUTION EPIDURAL; INFILTRATION; INTRACAUDAL; PERINEURAL ONCE
Status: COMPLETED | OUTPATIENT
Start: 2019-03-21 | End: 2019-03-21

## 2019-03-21 RX ADMIN — LIDOCAINE HYDROCHLORIDE 10 ML: 10 INJECTION, SOLUTION EPIDURAL; INFILTRATION; INTRACAUDAL; PERINEURAL at 08:41

## 2019-03-26 ENCOUNTER — TELEPHONE (OUTPATIENT)
Dept: INTERNAL MEDICINE CLINIC | Age: 84
End: 2019-03-26

## 2019-03-26 NOTE — TELEPHONE ENCOUNTER
Spoke with Willy Bang at Dr Janice Jewell office. Informed will need to fax request for records to be sent, provided fax #.

## 2019-03-26 NOTE — TELEPHONE ENCOUNTER
Dr Nalini Izaguirre, Infectious Diseases. [phone]  Needs latest labs, urine culture and urinalysis, and EKG  Please fax ASAP - Pt in office.   Attn:Vlad  Fax [phone]

## 2019-03-28 ENCOUNTER — OFFICE VISIT (OUTPATIENT)
Dept: NEUROLOGY | Age: 84
End: 2019-03-28

## 2019-03-28 VITALS
DIASTOLIC BLOOD PRESSURE: 80 MMHG | WEIGHT: 204 LBS | BODY MASS INDEX: 29.2 KG/M2 | RESPIRATION RATE: 18 BRPM | OXYGEN SATURATION: 98 % | SYSTOLIC BLOOD PRESSURE: 130 MMHG | HEIGHT: 70 IN | HEART RATE: 56 BPM

## 2019-03-28 DIAGNOSIS — G31.84 MILD COGNITIVE IMPAIRMENT: ICD-10-CM

## 2019-03-28 DIAGNOSIS — R41.3 MEMORY LOSS: Primary | ICD-10-CM

## 2019-03-28 RX ORDER — DONEPEZIL HYDROCHLORIDE 5 MG/1
5 TABLET, FILM COATED ORAL
Qty: 30 TAB | Refills: 0 | Status: SHIPPED | OUTPATIENT
Start: 2019-03-28 | End: 2019-04-22 | Stop reason: DRUGHIGH

## 2019-03-28 NOTE — PROGRESS NOTES
Chief Complaint   Patient presents with    Memory Loss         HISTORY OF PRESENT ILLNESS  Johan Ray is a 81 y.o. male who came in for neurological evaluation requested by Dr. Lexis Shine. For the past couple years, he has been having difficulties with short-term memory. He started noticing it more ever since his wife passed away last year. He says that he will get up to go do something and then all of a sudden he will forget what he was about to do. He has also been having difficulty finding words during conversations and tends to forget simple things. He remains independent with all activities of daily living. He now has a new girlfriend who is also the same age as him. She has not expressed any concern about his cognitive functioning. He drives, denies ever getting lost.  Maira Hobby his money and finances without problem. Lives alone and pays his bills on time. Past Medical History:   Diagnosis Date    Depression     Kidney disease      Current Outpatient Medications   Medication Sig    donepezil (ARICEPT) 5 mg tablet Take 1 Tab by mouth nightly.  VALERIAN ROOT by Does Not Apply route.  metoprolol succinate (TOPROL-XL) 25 mg XL tablet Take 0.5 Tabs by mouth daily for 90 days.  venlafaxine (EFFEXOR) 75 mg tablet Take 1 Tab by mouth two (2) times a day.  traZODone (DESYREL) 100 mg tablet Take 1 Tab by mouth nightly.  tamsulosin (FLOMAX) 0.4 mg capsule Take 1 Cap by mouth daily.  melatonin tab tablet Take  by mouth nightly.  multivitamin with iron tablet Take 1 Tab by mouth daily.  aspirin delayed-release 81 mg tablet Take  by mouth daily.  AZO CRANBERRY 938-27-92 mg-mg-million tab Take  by mouth.  ALPRAZolam (XANAX) 0.5 mg tablet Take 1 Tab by mouth nightly as needed for Anxiety. Max Daily Amount: 0.5 mg. No current facility-administered medications for this visit.       No Known Allergies  Family History   Problem Relation Age of Onset    Hypertension Mother cerebral hemmorage     Stroke Mother     Prostate Cancer Father      Social History     Tobacco Use    Smoking status: Former Smoker     Last attempt to quit: 2000     Years since quittin.2    Smokeless tobacco: Never Used   Substance Use Topics    Alcohol use: Yes     Drinks per session: 1 or 2     Binge frequency: Weekly    Drug use: No     Past Surgical History:   Procedure Laterality Date    HX COLONOSCOPY  2018    HX LITHOTRIPSY      HX PROSTATECTOMY           REVIEW OF SYSTEMS  Review of Systems - History obtained from the patient  Psychological ROS: negative  ENT ROS: negative  Hematological and Lymphatic ROS: negative  Endocrine ROS: negative  Respiratory ROS: no cough, shortness of breath, or wheezing  Cardiovascular ROS: no chest pain or dyspnea on exertion  Gastrointestinal ROS: no abdominal pain, change in bowel habits, or black or bloody stools  Genito-Urinary ROS: no dysuria, trouble voiding, or hematuria  Musculoskeletal ROS: negative  Dermatological ROS: negative      PHYSICAL EXAMINATION:    Visit Vitals  /80   Pulse (!) 56   Resp 18   Ht 5' 10\" (1.778 m)   Wt 92.5 kg (204 lb)   SpO2 98%   BMI 29.27 kg/m²     General:  Well defined, nourished, and groomed individual in no acute distress. Neck: Supple, nontender, no bruits, no pain with resistance to active range of motion. Heart: Regular rate and rhythm, no murmurs, rub, or gallop. Normal S1S2. Lungs:  Clear to auscultation bilaterally with equal chest expansion, no cough, no wheeze  Musculoskeletal:  Extremities revealed no edema and had full range of motion of joints. Psych:  Good mood and bright affect    NEUROLOGICAL EXAMINATION:     Mental Status:   Alert and oriented to person, place, and time. He scored 24/30 on [place] cognitive assessment. His short-term/delayed recall was 0/5   Speech is fluent. Cranial Nerves:    II, III, IV, VI:  Visual acuity grossly intact.  Visual fields are normal.    Pupils are equal, round, and reactive to light and accommodation. Extra-ocular movements are full and fluid. Fundoscopic exam was benign, no ptosis or nystagmus. V-XII: Hearing is grossly intact. Facial features are symmetric, with normal sensation and strength. The palate rises symmetrically and the tongue protrudes midline. Sternocleidomastoids 5/5. Motor Examination: Normal tone, bulk, and strength. 5/5 muscle strength throughout. No cogwheel rigidity or clonus present. Sensory exam:  Normal throughout to pinprick, temperature, and vibration sense. Normal proprioception. Coordination:  Heel-to-shin was smooth and symmetrical bilaterally. Finger to nose and rapid arm movement testing was normal.   No resting or intention tremor    Gait and Station:  Steady while walking on toes, heels, and with tandem walking. Normal arm swing. No Rhomberg or pronator drift. No muscle wasting or fasiculations noted. Reflexes:  DTRs 2+ throughout. Toes downgoing. LABS / IMAGING  Lab Results   Component Value Date/Time    WBC 6.0 10/18/2018 08:13 AM    HGB 15.8 10/18/2018 08:13 AM    HCT 46.2 10/18/2018 08:13 AM    PLATELET 288 (L) 73/75/1720 08:13 AM    MCV 90 10/18/2018 08:13 AM     Lab Results   Component Value Date/Time    Sodium 144 10/18/2018 08:13 AM    Potassium 4.8 10/18/2018 08:13 AM    Chloride 106 10/18/2018 08:13 AM    CO2 26 10/18/2018 08:13 AM    Glucose 108 (H) 10/18/2018 08:13 AM    BUN 31 (H) 10/18/2018 08:13 AM    Creatinine 1.60 (H) 10/18/2018 08:13 AM    BUN/Creatinine ratio 19 10/18/2018 08:13 AM    GFR est AA 46 (L) 10/18/2018 08:13 AM    GFR est non-AA 40 (L) 10/18/2018 08:13 AM    Calcium 9.7 10/18/2018 08:13 AM    Bilirubin, total 0.6 10/18/2018 08:13 AM    AST (SGOT) 22 10/18/2018 08:13 AM    Alk.  phosphatase 60 10/18/2018 08:13 AM    Protein, total 6.6 10/18/2018 08:13 AM    Albumin 3.9 10/18/2018 08:13 AM    A-G Ratio 1.4 10/18/2018 08:13 AM    ALT (SGPT) 16 10/18/2018 08:13 AM     Lab Results   Component Value Date/Time    TSH 1.210 10/18/2018 08:13 AM       ASSESSMENT    ICD-10-CM ICD-9-CM    1. Memory loss R41.3 780.93 CT HEAD WO CONT      VITAMIN B12      donepezil (ARICEPT) 5 mg tablet   2. Mild cognitive impairment G31.84 331.83 donepezil (ARICEPT) 5 mg tablet       DISCUSSION  Mr. Maribel Gold has significant difficulty with short-term recall but does well otherwise on cognitive screening. We discussed that this could be mild cognitive impairment or early dementia. He is independent with all activities of daily living and is fairly functional.   Association of memory loss with underlying depression was discussed but he denies feeling sad or depressed  Will check CT brain and vitamin B12 levels  Try donepezil 5 mg daily for 1 month and increase to 10 mg daily  He should try to do exercises to strengthen his memory and different resources were discussed  Importance of physical exercise was also reviewed  Follow-up in 6 months    Quirino Bhandari MD  Diplomate, American Board of Psychiatry & Neurology (Neurology)  Diplomate, American Board of Psychiatry & Neurology (Clinical Neurophysiology)  Diplomate, American Board of Electrodiagnostic Medicine  This note will not be viewable in [address]

## 2019-03-28 NOTE — PATIENT INSTRUCTIONS
A Healthy Lifestyle: Care Instructions Your Care Instructions A healthy lifestyle can help you feel good, stay at a healthy weight, and have plenty of energy for both work and play. A healthy lifestyle is something you can share with your whole family. A healthy lifestyle also can lower your risk for serious health problems, such as high blood pressure, heart disease, and diabetes. You can follow a few steps listed below to improve your health and the health of your family. Follow-up care is a key part of your treatment and safety. Be sure to make and go to all appointments, and call your doctor if you are having problems. It's also a good idea to know your test results and keep a list of the medicines you take. How can you care for yourself at home? · Do not eat too much sugar, fat, or fast foods. You can still have dessert and treats now and then. The goal is moderation. · Start small to improve your eating habits. Pay attention to portion sizes, drink less juice and soda pop, and eat more fruits and vegetables. ? Eat a healthy amount of food. A 3-ounce serving of meat, for example, is about the size of a deck of cards. Fill the rest of your plate with vegetables and whole grains. ? Limit the amount of soda and sports drinks you have every day. Drink more water when you are thirsty. ? Eat at least 5 servings of fruits and vegetables every day. It may seem like a lot, but it is not hard to reach this goal. A serving or helping is 1 piece of fruit, 1 cup of vegetables, or 2 cups of leafy, raw vegetables. Have an apple or some carrot sticks as an afternoon snack instead of a candy bar. Try to have fruits and/or vegetables at every meal. 
· Make exercise part of your daily routine. You may want to start with simple activities, such as walking, bicycling, or slow swimming. Try to be active 30 to 60 minutes every day.  You do not need to do all 30 to 60 minutes all at once. For example, you can exercise 3 times a day for 10 or 20 minutes. Moderate exercise is safe for most people, but it is always a good idea to talk to your doctor before starting an exercise program. 
· Keep moving. Jae Gonzalez the lawn, work in the garden, or [employer]. Take the stairs instead of the elevator at work. · If you smoke, quit. People who smoke have an increased risk for heart attack, stroke, cancer, and other lung illnesses. Quitting is hard, but there are ways to boost your chance of quitting tobacco for good. ? Use nicotine gum, patches, or lozenges. ? Ask your doctor about stop-smoking programs and medicines. ? Keep trying. In addition to reducing your risk of diseases in the future, you will notice some benefits soon after you stop using tobacco. If you have shortness of breath or asthma symptoms, they will likely get better within a few weeks after you quit. · Limit how much alcohol you drink. Moderate amounts of alcohol (up to 2 drinks a day for men, 1 drink a day for women) are okay. But drinking too much can lead to liver problems, high blood pressure, and other health problems. Family health If you have a family, there are many things you can do together to improve your health. · Eat meals together as a family as often as possible. · Eat healthy foods. This includes fruits, vegetables, lean meats and dairy, and whole grains. · Include your family in your fitness plan. Most people think of activities such as jogging or tennis as the way to fitness, but there are many ways you and your family can be more active. Anything that makes you breathe hard and gets your heart pumping is exercise. Here are some tips: 
? Walk to do errands or to take your child to school or the bus. 
? Go for a family bike ride after dinner instead of watching TV. Where can you learn more? Go to http://mey-mone.info/. Enter G605 in the search box to learn more about \"A Healthy Lifestyle: Care Instructions. \" Current as of: September 11, 2018 Content Version: 11.9 © 5256-7308 [employer]. Care instructions adapted under license by [employer] (which disclaims liability or warranty for this information). If you have questions about a medical condition or this instruction, always ask your healthcare professional. [facility] [address] any warranty or liability for your use of this information. PRESCRIPTION REFILL POLICY [facility] Statement to Patients April 1, 2014 In an effort to ensure the large volume of patient prescription refills is processed in the most efficient and expeditious manner, we are asking our patients to assist us by calling your Pharmacy for all prescription refills, this will include also your  Mail Order Pharmacy. The pharmacy will contact our office electronically to continue the refill process. Please do not wait until the last minute to call your pharmacy. We need at least 48 hours (2days) to fill prescriptions. We also encourage you to call your pharmacy before going to  your prescription to make sure it is ready. With regard to controlled substance prescription refill requests (narcotic refills) that need to be picked up at our office, we ask your cooperation by providing us with at least 72 hours (3days) notice that you will need a refill. We will not refill narcotic prescription refill requests after 4:00pm on any weekday, Monday through Thursday, or after 2:00pm on Fridays, or on the weekends. We encourage everyone to explore another way of getting your prescription refill request processed using [employer], our patient web portal through our electronic medical record system.  [employer] is an efficient and effective way to communicate your medication request directly to the office and downloadable as an john on your smart phone . [employer] also features a review functionality that allows you to view your medication list as well as leave messages for your physician. Are you ready to get connected? If so please review the attatched instructions or speak to any of our staff to get you set up right away! Thank you so much for your cooperation. Should you have any questions please contact our Practice Administrator. The Physicians and Staff,  [facility]

## 2019-03-29 LAB — VIT B12 SERPL-MCNC: 1175 PG/ML (ref 232–1245)

## 2019-04-04 ENCOUNTER — HOSPITAL ENCOUNTER (OUTPATIENT)
Dept: CT IMAGING | Age: 84
Discharge: HOME | End: 2019-04-04
Attending: PSYCHIATRY & NEUROLOGY
Payer: MEDICARE

## 2019-04-04 DIAGNOSIS — R41.3 MEMORY LOSS: ICD-10-CM

## 2019-04-04 PROCEDURE — 70450 CT HEAD/BRAIN W/O DYE: CPT

## 2019-04-04 NOTE — PROGRESS NOTES
Pt informed of this result as written by Dr Debbie Castillo. Pt voiced understanding of this information.

## 2019-04-22 DIAGNOSIS — G31.84 MILD COGNITIVE IMPAIRMENT: Primary | ICD-10-CM

## 2019-04-22 RX ORDER — DONEPEZIL HYDROCHLORIDE 10 MG/1
10 TABLET, FILM COATED ORAL
Qty: 90 TAB | Refills: 2 | Status: SHIPPED | OUTPATIENT
Start: 2019-04-22 | End: 2019-09-20 | Stop reason: SDUPTHER

## 2019-05-11 DIAGNOSIS — N40.0 BENIGN PROSTATIC HYPERPLASIA, UNSPECIFIED WHETHER LOWER URINARY TRACT SYMPTOMS PRESENT: ICD-10-CM

## 2019-05-13 RX ORDER — TAMSULOSIN HYDROCHLORIDE 0.4 MG/1
CAPSULE ORAL
Qty: 90 CAP | Refills: 1 | Status: SHIPPED | OUTPATIENT
Start: 2019-05-13 | End: 2019-09-20 | Stop reason: SDUPTHER

## 2019-05-20 ENCOUNTER — OFFICE VISIT (OUTPATIENT)
Dept: INTERNAL MEDICINE CLINIC | Age: 84
End: 2019-05-20

## 2019-05-20 VITALS
RESPIRATION RATE: 19 BRPM | WEIGHT: 200.6 LBS | SYSTOLIC BLOOD PRESSURE: 110 MMHG | HEIGHT: 70 IN | OXYGEN SATURATION: 98 % | DIASTOLIC BLOOD PRESSURE: 90 MMHG | HEART RATE: 67 BPM | BODY MASS INDEX: 28.72 KG/M2

## 2019-05-20 DIAGNOSIS — R19.5 DARK STOOLS: ICD-10-CM

## 2019-05-20 DIAGNOSIS — F32.89 OTHER DEPRESSION: ICD-10-CM

## 2019-05-20 DIAGNOSIS — F51.01 PRIMARY INSOMNIA: ICD-10-CM

## 2019-05-20 DIAGNOSIS — N52.9 ERECTILE DYSFUNCTION, UNSPECIFIED ERECTILE DYSFUNCTION TYPE: Primary | ICD-10-CM

## 2019-05-20 DIAGNOSIS — H61.893 IRRITATION OF EXTERNAL EAR CANAL, BILATERAL: ICD-10-CM

## 2019-05-20 RX ORDER — SILDENAFIL 25 MG/1
25 TABLET, FILM COATED ORAL AS NEEDED
Qty: 30 TAB | Refills: 1 | Status: SHIPPED | OUTPATIENT
Start: 2019-05-20 | End: 2021-03-26 | Stop reason: ALTCHOICE

## 2019-05-20 RX ORDER — DESONIDE 0.5 MG/G
OINTMENT TOPICAL 2 TIMES DAILY
Qty: 15 G | Refills: 0 | Status: SHIPPED | OUTPATIENT
Start: 2019-05-20 | End: 2019-10-20

## 2019-05-20 NOTE — PROGRESS NOTES
Pt is here concerned about a few 'things\"  Black stool for the past few months 'generally hard'. H/o of dryness and itch to b/l ears, requesting cream that was previously prescribed. Pt also reports suffering from insomnia and lastly erectile dysfunction.

## 2019-05-20 NOTE — PROGRESS NOTES
Acute Care Note    Lashon Jeffrey is 81 y.o. male. he presents for evaluation of Erectile Dysfunction and Stool Color Change    He has some present Left kidney pain, seeing nephrology tomorrow. Reports that this is not severe. He has been noting some darker stools. He does not think they are black. No foul odor or sticky consistency. He had a colonoscopy 1 year ago which was normal.  No diarrhea or constipation. He admits to not drinking very much water. He has had ongoing insomnia. Better now with Valerian root, melatonin. He has ongoing issues with ED. He has a new girlfriend but has recently noted 1 year since his wife passed. He is interested in pursuing a physical relationship but has erectile problems. Prior to Admission medications    Medication Sig Start Date End Date Taking? Authorizing Provider   tamsulosin (FLOMAX) 0.4 mg capsule TAKE 1 CAPSULE BY MOUTH  DAILY 5/13/19  Yes Jewels Ash MD   donepezil (ARICEPT) 10 mg tablet Take 1 Tab by mouth nightly. 4/22/19  Yes MD ELVA BurlesonN ROOT by Does Not Apply route. Yes Provider, Historical   metoprolol succinate (TOPROL-XL) 25 mg XL tablet Take 0.5 Tabs by mouth daily for 90 days. 2/20/19 5/21/19 Yes Jewels Ash MD   venlafaxine [facility]) 75 mg tablet Take 1 Tab by mouth two (2) times a day. 2/20/19  Yes Jewels Ash MD   traZODone (DESYREL) 100 mg tablet Take 1 Tab by mouth nightly. 2/20/19  Yes Jewels Ash MD   melatonin tab tablet Take  by mouth nightly. Yes Provider, Historical   multivitamin with iron tablet Take 1 Tab by mouth daily. Yes Provider, Historical   aspirin delayed-release 81 mg tablet Take  by mouth daily. Yes Provider, Historical   Vee Linares 024-91-38 mg-mg-million tab Take  by mouth. Yes Provider, Historical   ALPRAZolam (XANAX) 0.5 mg tablet Take 1 Tab by mouth nightly as needed for Anxiety.  Max Daily Amount: 0.5 mg. 10/17/18   Jewels Ash MD Patient Active Problem List   Diagnosis Code    Erectile dysfunction N52.9    Irritation of external ear canal, bilateral H61.893    Primary insomnia F51.01         Review of Systems   Constitutional: Negative. Respiratory: Negative. Cardiovascular: Negative. Gastrointestinal: Negative. Visit Vitals  /90 (BP 1 Location: Right arm, BP Patient Position: Sitting)   Pulse 67   Resp 19   Ht 5' 10\" (1.778 m)   Wt 200 lb 9.6 oz (91 kg)   SpO2 98%   BMI 28.78 kg/m²       Physical Exam   HENT:   Mouth/Throat: Oropharynx is clear and moist.   Cardiovascular: Normal rate and regular rhythm. Pulmonary/Chest: Effort normal and breath sounds normal.   Abdominal: Soft. Bowel sounds are normal.           ASSESSMENT/PLAN  Diagnoses and all orders for this visit:    1. Erectile dysfunction, unspecified erectile dysfunction type  -     sildenafil citrate (VIAGRA) 25 mg tablet; Take 1 Tab by mouth as needed (prior to sexual activity). 2. Irritation of external ear canal, bilateral  -     desonide (DESOWEN) 0.05 % topical ointment; Apply  to affected area two (2) times a day. 3. Primary insomnia    4. Other depression    5. Dark stools - This does not appear to be melena. Will follow up for worsening          Advised the patient to call back or return to office if symptoms worsen/change/persist.   Discussed expected course/resolution/complications of diagnosis in detail with patient. Medication risks/benefits/costs/interactions/alternatives discussed with patient. The patient was given an after visit summary which includes diagnoses, current medications, & vitals. They expressed understanding with the diagnosis and plan.

## 2019-05-22 LAB — CREATININE, EXTERNAL: 1.49

## 2019-06-05 ENCOUNTER — HOSPITAL ENCOUNTER (OUTPATIENT)
Dept: ULTRASOUND IMAGING | Age: 84
Discharge: HOME | End: 2019-06-05
Attending: INTERNAL MEDICINE
Payer: MEDICARE

## 2019-06-05 DIAGNOSIS — N13.2 HYDRONEPHROSIS WITH RENAL AND URETERAL CALCULUS OBSTRUCTION: ICD-10-CM

## 2019-06-05 DIAGNOSIS — R10.9 ABDOMINAL PAIN: ICD-10-CM

## 2019-06-05 DIAGNOSIS — N18.30 CHRONIC KIDNEY DISEASE, STAGE III (MODERATE) (HCC): ICD-10-CM

## 2019-06-05 DIAGNOSIS — Z87.442 PERSONAL HISTORY OF URINARY CALCULI: ICD-10-CM

## 2019-06-05 PROCEDURE — 76770 US EXAM ABDO BACK WALL COMP: CPT

## 2019-06-07 ENCOUNTER — OFFICE VISIT (OUTPATIENT)
Dept: INTERNAL MEDICINE CLINIC | Age: 84
End: 2019-06-07

## 2019-06-07 VITALS
SYSTOLIC BLOOD PRESSURE: 121 MMHG | OXYGEN SATURATION: 97 % | RESPIRATION RATE: 18 BRPM | TEMPERATURE: 98.7 F | BODY MASS INDEX: 28.66 KG/M2 | WEIGHT: 200.2 LBS | HEART RATE: 63 BPM | DIASTOLIC BLOOD PRESSURE: 80 MMHG | HEIGHT: 70 IN

## 2019-06-07 DIAGNOSIS — N18.30 STAGE 3 CHRONIC KIDNEY DISEASE (HCC): ICD-10-CM

## 2019-06-07 DIAGNOSIS — R53.82 CHRONIC FATIGUE: Primary | ICD-10-CM

## 2019-06-07 NOTE — PROGRESS NOTES
Follow Up Visit    Tabitha Lieberman is a 81 y.o. male. he presents for Fatigue and Insomnia    Fatigue  Patient complains of fatigue. Symptoms began problem is longstanding. Sentinal symptom the patient feels fatigue began with: none. Symptoms of the patient's fatigue have been fatigue with paradoxical insomnia. Patient describes the following psychologic symptoms: Grief regarding the loss of his wife last year. Patient denies significant change in weight, true exercise intolerance. The course has been stable. Severity has been symptoms bothersome, but easily able to carry out all usual work/school/family. Previous visits for this problem: none. Patient Active Problem List   Diagnosis Code    Erectile dysfunction N52.9    Irritation of external ear canal, bilateral H61.893    Primary insomnia F51.01         Prior to Admission medications    Medication Sig Start Date End Date Taking? Authorizing Provider   desonide (DESOWEN) 0.05 % topical ointment Apply  to affected area two (2) times a day. 5/20/19  Yes Yanci Walton MD   sildenafil citrate (VIAGRA) 25 mg tablet Take 1 Tab by mouth as needed (prior to sexual activity). 5/20/19  Yes Yanci Walton MD   tamsulosin (FLOMAX) 0.4 mg capsule TAKE 1 CAPSULE BY MOUTH  DAILY 5/13/19  Yes Yanci Walton MD   donepezil (ARICEPT) 10 mg tablet Take 1 Tab by mouth nightly. 4/22/19  Yes Samy Morales MD   [facility]) 75 mg tablet Take 1 Tab by mouth two (2) times a day. 2/20/19  Yes Yanci Walton MD   traZODone (DESYREL) 100 mg tablet Take 1 Tab by mouth nightly. 2/20/19  Yes Yanci Walton MD   melatonin tab tablet Take  by mouth nightly. Yes Provider, Historical   multivitamin with iron tablet Take 1 Tab by mouth daily. Yes Provider, Historical   aspirin delayed-release 81 mg tablet Take  by mouth daily. Yes Provider, Historical   [place] Ar [id number] mg-mg-million tab Take  by mouth.    Yes Provider, Historical   VALERIAN ROOT by Does Not Apply route. Provider, Historical         Health Maintenance   Topic Date Due    Shingrix Vaccine Age 49> (1 of 2) 05/25/1986    GLAUCOMA SCREENING Q2Y  05/25/2001    Pneumococcal 65+ years (2 of 2 - PPSV23) 05/25/2001    Influenza Age 9 to Adult  08/01/2019    MEDICARE YEARLY EXAM  02/21/2020    COLONOSCOPY  03/19/2021    DTaP/Tdap/Td series (2 - Td) 02/20/2029       Review of Systems   Constitutional: Positive for malaise/fatigue. Eyes: Positive for blurred vision. Respiratory: Negative. Cardiovascular: Negative. Visit Vitals  /80 (BP 1 Location: Right arm, BP Patient Position: Sitting)   Pulse 63   Temp 98.7 °F (37.1 °C) (Oral)   Resp 18   Ht 5' 10\" (1.778 m)   Wt 200 lb 3.2 oz (90.8 kg)   SpO2 97%   BMI 28.73 kg/m²       Physical Exam      ASSESSMENT/PLAN    Diagnoses and all orders for this visit:    1. Chronic fatigue    2. Stage 3 chronic kidney disease ([facility] [address] -discussed with the patient that some of his fatigue may be secondary to his known chronic kidney disease. He will follow-up with nephrology for now. Await further input and recommendations regarding this symptom. Follow-up and Dispositions    · Return in about 3 months (around 9/7/2019) for Follow up.

## 2019-07-09 DIAGNOSIS — F51.01 PRIMARY INSOMNIA: ICD-10-CM

## 2019-07-09 DIAGNOSIS — F32.0 CURRENT MILD EPISODE OF MAJOR DEPRESSIVE DISORDER WITHOUT PRIOR EPISODE (HCC): ICD-10-CM

## 2019-07-09 DIAGNOSIS — Z86.79 HISTORY OF ATRIAL FIBRILLATION: ICD-10-CM

## 2019-07-11 RX ORDER — METOPROLOL SUCCINATE 25 MG/1
TABLET, EXTENDED RELEASE ORAL
Qty: 45 TAB | Refills: 1 | Status: SHIPPED | OUTPATIENT
Start: 2019-07-11 | End: 2020-01-06

## 2019-07-11 RX ORDER — VENLAFAXINE 75 MG/1
TABLET ORAL
Qty: 180 TAB | Refills: 1 | Status: SHIPPED | OUTPATIENT
Start: 2019-07-11 | End: 2019-10-03 | Stop reason: DRUGHIGH

## 2019-07-11 RX ORDER — TRAZODONE HYDROCHLORIDE 100 MG/1
TABLET ORAL
Qty: 90 TAB | Refills: 1 | Status: SHIPPED | OUTPATIENT
Start: 2019-07-11 | End: 2019-08-28 | Stop reason: SDUPTHER

## 2019-07-24 LAB — CREATININE, EXTERNAL: 1.36

## 2019-07-30 ENCOUNTER — OFFICE VISIT (OUTPATIENT)
Dept: INTERNAL MEDICINE CLINIC | Age: 84
End: 2019-07-30

## 2019-07-30 VITALS
OXYGEN SATURATION: 98 % | BODY MASS INDEX: 28.18 KG/M2 | TEMPERATURE: 97.9 F | DIASTOLIC BLOOD PRESSURE: 99 MMHG | WEIGHT: 196.8 LBS | SYSTOLIC BLOOD PRESSURE: 120 MMHG | HEIGHT: 70 IN | HEART RATE: 68 BPM | RESPIRATION RATE: 17 BRPM

## 2019-07-30 DIAGNOSIS — F51.01 PRIMARY INSOMNIA: Primary | ICD-10-CM

## 2019-07-31 NOTE — PROGRESS NOTES
Acute Care Note    Jennifer Perkins is 81 y.o. male. he presents for evaluation of Insomnia    Mental Health Review  Patient is seen for sleep disturbance. He reports that given his urinary issues, he wakes up at 2:30 AM to urinate every night. He subsequently cannot fall back to sleep. He currently takes trazodone and is tolerating the medication. He is noting that the medication does not work the entire night. He denies: chest pain, dizziness, racing thoughts. Reported side effects from the treatment: none. Prior to Admission medications    Medication Sig Start Date End Date Taking? Authorizing Provider   traZODone (DESYREL) 100 mg tablet TAKE 1 TABLET BY MOUTH  NIGHTLY 7/11/19  Yes Gaetano Valle MD   metoprolol succinate (TOPROL-XL) 25 mg XL tablet TAKE 1/2 TABLET BY MOUTH  DAILY 7/11/19  Yes Gaetano Valle MD   venlafaxine [facility]) 75 mg tablet TAKE 1 TABLET BY MOUTH TWO  TIMES DAILY 7/11/19  Yes Gaetano Valle MD   sildenafil citrate (VIAGRA) 25 mg tablet Take 1 Tab by mouth as needed (prior to sexual activity). 5/20/19  Yes Gaetano Valle MD   tamsulosin (FLOMAX) 0.4 mg capsule TAKE 1 CAPSULE BY MOUTH  DAILY 5/13/19  Yes Gaetano Valle MD   donepezil (ARICEPT) 10 mg tablet Take 1 Tab by mouth nightly. 4/22/19  Yes Keli Sol MD   melatonin tab tablet Take  by mouth nightly. Yes Provider, Historical   multivitamin with iron tablet Take 1 Tab by mouth daily. Yes Provider, Historical   aspirin delayed-release 81 mg tablet Take  by mouth daily. Yes Provider, Historical   Elmaray Hacker [id number] mg-mg-million tab Take  by mouth. Yes Provider, Historical   desonide (DESOWEN) 0.05 % topical ointment Apply  to affected area two (2) times a day. 5/20/19   MD NELLY Zacarias ROOT by Does Not Apply route.     Provider, Historical         Patient Active Problem List   Diagnosis Code    Erectile dysfunction N52.9    Irritation of external ear canal, bilateral D03.121    Primary insomnia F51.01         Review of Systems   Constitutional: Negative. Respiratory: Negative. Cardiovascular: Negative. Gastrointestinal: Negative. Visit Vitals  BP (!) 120/99 (BP 1 Location: Right arm, BP Patient Position: Sitting)   Pulse 68   Temp 97.9 °F (36.6 °C) (Oral)   Resp 17   Ht 5' 10\" (1.778 m)   Wt 196 lb 12.8 oz (89.3 kg)   SpO2 98%   BMI 28.24 kg/m²       Physical Exam   Constitutional: He is oriented to person, place, and time. No distress. Neurological: He is alert and oriented to person, place, and time. Psychiatric: He has a normal mood and affect. ASSESSMENT/PLAN  Diagnoses and all orders for this visit:    1. Primary insomnia -discussed with the patient to attempt taking an extra one half of the trazodone if he wakes up in the middle the night. The patient reports that he will try this and inform me as to the results. Advised the patient to call back or return to office if symptoms worsen/change/persist.   Discussed expected course/resolution/complications of diagnosis in detail with patient. Medication risks/benefits/costs/interactions/alternatives discussed with patient. The patient was given an after visit summary which includes diagnoses, current medications, & vitals. They expressed understanding with the diagnosis and plan.

## 2019-08-27 ENCOUNTER — OFFICE VISIT (OUTPATIENT)
Dept: INTERNAL MEDICINE CLINIC | Age: 84
End: 2019-08-27

## 2019-08-27 VITALS
OXYGEN SATURATION: 95 % | HEART RATE: 64 BPM | TEMPERATURE: 98 F | SYSTOLIC BLOOD PRESSURE: 130 MMHG | BODY MASS INDEX: 28.46 KG/M2 | HEIGHT: 70 IN | DIASTOLIC BLOOD PRESSURE: 90 MMHG | WEIGHT: 198.8 LBS | RESPIRATION RATE: 17 BRPM

## 2019-08-27 DIAGNOSIS — R42 LIGHTHEADED: Primary | ICD-10-CM

## 2019-08-27 RX ORDER — FINASTERIDE 5 MG/1
TABLET, FILM COATED ORAL
Refills: 3 | COMMUNITY
Start: 2019-07-16 | End: 2020-03-04 | Stop reason: ALTCHOICE

## 2019-08-27 RX ORDER — ERGOCALCIFEROL 1.25 MG/1
CAPSULE ORAL
Refills: 0 | COMMUNITY
Start: 2019-08-02 | End: 2019-10-20

## 2019-08-27 NOTE — PROGRESS NOTES
Acute Care Note    Suzanne De La Torre is 81 y.o. male. he presents for evaluation of Dizziness    On discussion with the patient the symptom is more 1 of lightheadedness. He tells me that he has been feeling this more in the previous 3 weeks. He has noticed it especially when going from a sitting to a standing position when riding in a car. Of note, the patient is currently on metoprolol as well as Flomax. He also provides a history of having recently been given hydrochlorothiazide by his nephrologist.  He reports drinking adequate amounts of water (3-4 large glasses a day). He denies chest pain or shortness of breath. Prior to Admission medications    Medication Sig Start Date End Date Taking? Authorizing Provider   ergocalciferol (ERGOCALCIFEROL) 50,000 unit capsule TK 1 C PO 1 TIME WEEKLY FOR 42 DAYS 8/2/19  Yes Provider, Historical   finasteride (PROSCAR) 5 mg tablet TK 1 T PO  QAM 7/16/19  Yes Provider, Historical   traZODone (DESYREL) 100 mg tablet TAKE 1 TABLET BY MOUTH  NIGHTLY 7/11/19  Yes Hua Arteaga MD   metoprolol succinate (TOPROL-XL) 25 mg XL tablet TAKE 1/2 TABLET BY MOUTH  DAILY 7/11/19  Yes Hua Arteaga MD   venlafaxine (EFFEXOR) 75 mg tablet TAKE 1 TABLET BY MOUTH TWO  TIMES DAILY 7/11/19  Yes Hua Arteaga MD   sildenafil citrate (VIAGRA) 25 mg tablet Take 1 Tab by mouth as needed (prior to sexual activity). 5/20/19  Yes Hua Arteaga MD   tamsulosin (FLOMAX) 0.4 mg capsule TAKE 1 CAPSULE BY MOUTH  DAILY 5/13/19  Yes Hua Arteaga MD   donepezil (ARICEPT) 10 mg tablet Take 1 Tab by mouth nightly. 4/22/19  Yes Ruthy Herron MD   melatonin tab tablet Take  by mouth nightly. Yes Provider, Historical   multivitamin with iron tablet Take 1 Tab by mouth daily. Yes Provider, Historical   aspirin delayed-release 81 mg tablet Take  by mouth daily. Yes Provider, Historical   Daniel Harm [id number] mg-mg-million tab Take  by mouth.    Yes Provider, Historical desonide (DESOWEN) 0.05 % topical ointment Apply  to affected area two (2) times a day. 5/20/19   Holley Almodovar MD   VAL[facility] by Does Not Apply route. 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 Eyes: Negative. Respiratory: Negative. Cardiovascular: Negative. Negative for chest pain and palpitations. Neurological:        Lightheaded           Visit Vitals  /90 (BP 1 Location: Right arm, BP Patient Position: Sitting)   Pulse 64   Temp 98 °F (36.7 °C) (Oral)   Resp 17   Ht 5' 10\" (1.778 m)   Wt 198 lb 12.8 oz (90.2 kg)   SpO2 95%   BMI 28.52 kg/m²       Physical Exam   Constitutional: He appears well-developed and well-nourished. No distress. Cardiovascular: Normal rate and regular rhythm. Pulmonary/Chest: Effort normal and breath sounds normal.           ASSESSMENT/PLAN  Diagnoses and all orders for this visit:    1. Lightheaded -I suspect the patient's symptoms are secondary to a combination of alpha blockade (of his tamsulosin) as well as beta-blockade with his metoprolol. The addition of hydrochlorothiazide also seems to have impacted this. I have asked him to take his tamsulosin every other day for now. He will need to monitor his sensations of lightheadedness with this. We may need to reach out to Dr. Carla Calvin (nephrology) as well to discuss his symptoms. Advised the patient to call back or return to office if symptoms worsen/change/persist.   Discussed expected course/resolution/complications of diagnosis in detail with patient. Medication risks/benefits/costs/interactions/alternatives discussed with patient. The patient was given an after visit summary which includes diagnoses, current medications, & vitals.    They expressed understanding with the diagnosis and plan.

## 2019-08-28 DIAGNOSIS — F51.01 PRIMARY INSOMNIA: ICD-10-CM

## 2019-08-28 RX ORDER — TRAZODONE HYDROCHLORIDE 100 MG/1
150 TABLET ORAL
Qty: 135 TAB | Refills: 1 | Status: SHIPPED | OUTPATIENT
Start: 2019-08-28 | End: 2019-11-26

## 2019-09-16 ENCOUNTER — TELEPHONE (OUTPATIENT)
Dept: INTERNAL MEDICINE CLINIC | Age: 84
End: 2019-09-16

## 2019-09-20 DIAGNOSIS — G31.84 MILD COGNITIVE IMPAIRMENT: ICD-10-CM

## 2019-09-20 DIAGNOSIS — N40.0 BENIGN PROSTATIC HYPERPLASIA, UNSPECIFIED WHETHER LOWER URINARY TRACT SYMPTOMS PRESENT: ICD-10-CM

## 2019-09-20 RX ORDER — TAMSULOSIN HYDROCHLORIDE 0.4 MG/1
CAPSULE ORAL
Qty: 90 CAP | Refills: 1 | OUTPATIENT
Start: 2019-09-20 | End: 2019-10-20

## 2019-09-20 RX ORDER — DONEPEZIL HYDROCHLORIDE 10 MG/1
TABLET, FILM COATED ORAL
Qty: 90 TAB | Refills: 2 | Status: SHIPPED | OUTPATIENT
Start: 2019-09-20 | End: 2020-01-07 | Stop reason: SDUPTHER

## 2019-10-03 ENCOUNTER — OFFICE VISIT (OUTPATIENT)
Dept: INTERNAL MEDICINE CLINIC | Age: 84
End: 2019-10-03

## 2019-10-03 VITALS
SYSTOLIC BLOOD PRESSURE: 110 MMHG | WEIGHT: 195.2 LBS | RESPIRATION RATE: 19 BRPM | TEMPERATURE: 97.8 F | DIASTOLIC BLOOD PRESSURE: 90 MMHG | HEART RATE: 64 BPM | HEIGHT: 70 IN | BODY MASS INDEX: 27.94 KG/M2 | OXYGEN SATURATION: 96 %

## 2019-10-03 DIAGNOSIS — R42 DIZZY: ICD-10-CM

## 2019-10-03 DIAGNOSIS — F51.01 PRIMARY INSOMNIA: Primary | ICD-10-CM

## 2019-10-03 RX ORDER — VENLAFAXINE HYDROCHLORIDE 150 MG/1
150 CAPSULE, EXTENDED RELEASE ORAL DAILY
Qty: 90 CAP | Refills: 1 | Status: SHIPPED | OUTPATIENT
Start: 2019-10-03 | End: 2020-01-08 | Stop reason: SDUPTHER

## 2019-10-03 RX ORDER — HYDROCHLOROTHIAZIDE 12.5 MG/1
12.5 TABLET ORAL DAILY
COMMUNITY
End: 2020-01-17

## 2019-10-03 NOTE — PATIENT INSTRUCTIONS
Please stop the Hydrochlorothiazide. Monitor your blood pressures and let me know if they are elevated. I will inform Dr. Lidia Up of this change. Begin the higher dose of Effexor and let me now how you feel.

## 2019-10-03 NOTE — PROGRESS NOTES
Follow Up Visit    Xiao Mondragon is a 81 y.o. male. he presents for Dizziness    Vertigo  Patient complains of faintness/lightheadedness. The symptoms started several weeks ago and are unchanged. The attacks occur daily and last a few minutes. Positions that worsen symptoms: standing up. Previous workup/treatments: none. Associated ear symptoms: none. Associated CNS symptoms: none. Recent infections: none. Head trauma: denied. Drug ingestion: none Noise exposure: no occupational exposure. 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 Authorizing Provider   hydroCHLOROthiazide (HYDRODIURIL) 12.5 mg tablet Take 12.5 mg by mouth daily. Yes Provider, Historical   donepezil (ARICEPT) 10 mg tablet TAKE 1 TABLET BY MOUTH  NIGHTLY 9/20/19  Yes Jayy Garibay MD   tamsulosin (FLOMAX) 0.4 mg capsule TAKE 1 CAPSULE BY MOUTH  DAILY 9/20/19  Yes Samara Donnelly MD   traZODone (DESYREL) 100 mg tablet Take 1.5 Tabs by mouth nightly for 90 days. 8/28/19 11/26/19 Yes Samara Donnelly MD   ergocalciferol (ERGOCALCIFEROL) 50,000 unit capsule TK 1 C PO 1 TIME WEEKLY FOR 42 DAYS 8/2/19  Yes Provider, Historical   finasteride (PROSCAR) 5 mg tablet TK 1 T PO  QAM 7/16/19  Yes Provider, Historical   metoprolol succinate (TOPROL-XL) 25 mg XL tablet TAKE 1/2 TABLET BY MOUTH  DAILY 7/11/19  Yes Samara Donnelly MD   venlafaxine (EFFEXOR) 75 mg tablet TAKE 1 TABLET BY MOUTH TWO  TIMES DAILY 7/11/19  Yes Samara Donnelly MD   desonide (DESOWEN) 0.05 % topical ointment Apply  to affected area two (2) times a day. 5/20/19  Yes Samara Donnelly MD   sildenafil citrate (VIAGRA) 25 mg tablet Take 1 Tab by mouth as needed (prior to sexual activity).  5/20/19  Yes MD NELLY Valenzuela by Does Not Apply route. Yes Provider, Historical   melatonin tab tablet Take  by mouth nightly. Yes Provider, Historical   multivitamin with iron tablet Take 1 Tab by mouth daily. Yes Provider, Historical   aspirin delayed-release 81 mg tablet Take  by mouth daily. Yes Provider, Historical   Murtis Castleman [id number] mg-mg-million tab Take  by mouth. Yes Provider, Historical         Health Maintenance   Topic Date Due    GLAUCOMA SCREENING Q2Y  05/25/2001    Influenza Age 5 to Adult  08/01/2019    Shingrix Vaccine Age 50> (2 of 2) 09/23/2019    Pneumococcal 65+ years (2 of 2 - PPSV23) 02/20/2020    MEDICARE YEARLY EXAM  02/21/2020    COLONOSCOPY  03/19/2021    DTaP/Tdap/Td series (2 - Td) 02/20/2029       Review of Systems   Constitutional: Negative. Cardiovascular: Negative. Neurological: Positive for dizziness. Visit Vitals  /90 (BP 1 Location: Right arm, BP Patient Position: Sitting)   Pulse 64   Temp 97.8 °F (36.6 °C) (Oral)   Resp 19   Ht 5' 10\" (1.778 m)   Wt 195 lb 3.2 oz (88.5 kg)   SpO2 96%   BMI 28.01 kg/m²       Physical Exam   Constitutional: He is oriented to person, place, and time. He appears well-nourished. Cardiovascular: Normal rate and regular rhythm. Pulmonary/Chest: Effort normal and breath sounds normal. No respiratory distress. Neurological: He is alert and oriented to person, place, and time. ASSESSMENT/PLAN    Diagnoses and all orders for this visit:    1. Primary insomnia  -     venlafaxine-SR (EFFEXOR-XR) 150 mg capsule; Take 1 Cap by mouth daily. (Patient taking differently: Take 75 mg by mouth daily.)    2. Dizzy - We will hold the patient's BP medication for now. Monitor for any improvements       Follow-up and Dispositions    · Return if symptoms worsen or fail to improve.

## 2019-10-17 ENCOUNTER — TELEPHONE (OUTPATIENT)
Dept: INTERNAL MEDICINE CLINIC | Age: 84
End: 2019-10-17

## 2019-10-18 ENCOUNTER — TELEPHONE (OUTPATIENT)
Dept: INTERNAL MEDICINE CLINIC | Age: 84
End: 2019-10-18

## 2019-10-18 DIAGNOSIS — I95.1 ORTHOSTASIS: Primary | ICD-10-CM

## 2019-10-18 NOTE — TELEPHONE ENCOUNTER
Spoke with pt who seem highly upset regarding his apt on [place]; stated it is too far out and no one seems to care or do anything about it. I apologized pt and tried to reason with him; tried to figure out a time and day for his convenience. Pt ended the conversation with \"I am going to find another doctor\" and hung up the phone.

## 2019-10-18 NOTE — TELEPHONE ENCOUNTER
Pt need a  A referral to see another dr for his echo adult complete  TO ONE THAT DR Jonel Hanna REFERRED HIM TO DOES NOT HAVE  Any early appt available  Pt wants someone to call him asap at [phone]  With the referral

## 2019-10-20 ENCOUNTER — APPOINTMENT (OUTPATIENT)
Dept: CT IMAGING | Age: 84
End: 2019-10-20
Attending: EMERGENCY MEDICINE
Payer: MEDICARE

## 2019-10-20 ENCOUNTER — HOSPITAL ENCOUNTER (EMERGENCY)
Age: 84
Discharge: HOME | End: 2019-10-20
Attending: EMERGENCY MEDICINE | Admitting: EMERGENCY MEDICINE
Payer: MEDICARE

## 2019-10-20 VITALS
BODY MASS INDEX: 27.92 KG/M2 | OXYGEN SATURATION: 96 % | HEIGHT: 70 IN | RESPIRATION RATE: 17 BRPM | TEMPERATURE: 97.5 F | HEART RATE: 81 BPM | DIASTOLIC BLOOD PRESSURE: 76 MMHG | SYSTOLIC BLOOD PRESSURE: 103 MMHG | WEIGHT: 195 LBS

## 2019-10-20 DIAGNOSIS — R42 DIZZINESS: Primary | ICD-10-CM

## 2019-10-20 DIAGNOSIS — N30.00 ACUTE CYSTITIS WITHOUT HEMATURIA: ICD-10-CM

## 2019-10-20 LAB
ALBUMIN SERPL-MCNC: 3.6 G/DL (ref 3.5–5)
ALBUMIN/GLOB SERPL: 1 {RATIO} (ref 1.1–2.2)
ALP SERPL-CCNC: 57 U/L (ref 45–117)
ALT SERPL-CCNC: 30 U/L (ref 12–78)
ANION GAP SERPL CALC-SCNC: 5 MMOL/L (ref 5–15)
APPEARANCE UR: ABNORMAL
AST SERPL-CCNC: 16 U/L (ref 15–37)
ATRIAL RATE: 73 BPM
BACTERIA URNS QL MICRO: ABNORMAL /HPF
BASOPHILS # BLD: 0 K/UL (ref 0–0.1)
BASOPHILS NFR BLD: 0 % (ref 0–1)
BILIRUB SERPL-MCNC: 0.6 MG/DL (ref 0.2–1)
BILIRUB UR QL: NEGATIVE
BUN SERPL-MCNC: 47 MG/DL (ref 6–20)
BUN/CREAT SERPL: 24 (ref 12–20)
CALCIUM SERPL-MCNC: 9.9 MG/DL (ref 8.5–10.1)
CALCULATED P AXIS, ECG09: 55 DEGREES
CALCULATED R AXIS, ECG10: -84 DEGREES
CALCULATED T AXIS, ECG11: 18 DEGREES
CHLORIDE SERPL-SCNC: 103 MMOL/L (ref 97–108)
CO2 SERPL-SCNC: 31 MMOL/L (ref 21–32)
COLOR UR: ABNORMAL
COMMENT, HOLDF: NORMAL
CREAT SERPL-MCNC: 1.97 MG/DL (ref 0.7–1.3)
DIAGNOSIS, 93000: NORMAL
DIFFERENTIAL METHOD BLD: ABNORMAL
EOSINOPHIL # BLD: 0.1 K/UL (ref 0–0.4)
EOSINOPHIL NFR BLD: 2 % (ref 0–7)
EPITH CASTS URNS QL MICRO: ABNORMAL /LPF
ERYTHROCYTE [DISTWIDTH] IN BLOOD BY AUTOMATED COUNT: 12.8 % (ref 11.5–14.5)
GLOBULIN SER CALC-MCNC: 3.6 G/DL (ref 2–4)
GLUCOSE BLD STRIP.AUTO-MCNC: 108 MG/DL (ref 65–100)
GLUCOSE SERPL-MCNC: 94 MG/DL (ref 65–100)
GLUCOSE UR STRIP.AUTO-MCNC: NEGATIVE MG/DL
HCT VFR BLD AUTO: 48.4 % (ref 36.6–50.3)
HGB BLD-MCNC: 16.2 G/DL (ref 12.1–17)
HGB UR QL STRIP: ABNORMAL
IMM GRANULOCYTES # BLD AUTO: 0 K/UL (ref 0–0.04)
IMM GRANULOCYTES NFR BLD AUTO: 0 % (ref 0–0.5)
KETONES UR QL STRIP.AUTO: NEGATIVE MG/DL
LEUKOCYTE ESTERASE UR QL STRIP.AUTO: ABNORMAL
LYMPHOCYTES # BLD: 2.3 K/UL (ref 0.8–3.5)
LYMPHOCYTES NFR BLD: 24 % (ref 12–49)
MCH RBC QN AUTO: 30.1 PG (ref 26–34)
MCHC RBC AUTO-ENTMCNC: 33.5 G/DL (ref 30–36.5)
MCV RBC AUTO: 90 FL (ref 80–99)
MONOCYTES # BLD: 1 K/UL (ref 0–1)
MONOCYTES NFR BLD: 10 % (ref 5–13)
NEUTS SEG # BLD: 6.1 K/UL (ref 1.8–8)
NEUTS SEG NFR BLD: 64 % (ref 32–75)
NITRITE UR QL STRIP.AUTO: POSITIVE
NRBC # BLD: 0 K/UL (ref 0–0.01)
NRBC BLD-RTO: 0 PER 100 WBC
P-R INTERVAL, ECG05: 156 MS
PH UR STRIP: 5.5 [PH] (ref 5–8)
PLATELET # BLD AUTO: 146 K/UL (ref 150–400)
PMV BLD AUTO: 10.1 FL (ref 8.9–12.9)
POTASSIUM SERPL-SCNC: 3.7 MMOL/L (ref 3.5–5.1)
PROT SERPL-MCNC: 7.2 G/DL (ref 6.4–8.2)
PROT UR STRIP-MCNC: ABNORMAL MG/DL
Q-T INTERVAL, ECG07: 396 MS
QRS DURATION, ECG06: 90 MS
QTC CALCULATION (BEZET), ECG08: 436 MS
RBC # BLD AUTO: 5.38 M/UL (ref 4.1–5.7)
RBC #/AREA URNS HPF: ABNORMAL /HPF (ref 0–5)
SAMPLES BEING HELD,HOLD: NORMAL
SERVICE CMNT-IMP: ABNORMAL
SODIUM SERPL-SCNC: 139 MMOL/L (ref 136–145)
SP GR UR REFRACTOMETRY: 1.02 (ref 1–1.03)
TROPONIN I SERPL-MCNC: <0.05 NG/ML
UR CULT HOLD, URHOLD: NORMAL
UROBILINOGEN UR QL STRIP.AUTO: 0.2 EU/DL (ref 0.2–1)
VENTRICULAR RATE, ECG03: 73 BPM
WBC # BLD AUTO: 9.6 K/UL (ref 4.1–11.1)
WBC URNS QL MICRO: >100 /HPF (ref 0–4)

## 2019-10-20 PROCEDURE — 87186 SC STD MICRODIL/AGAR DIL: CPT

## 2019-10-20 PROCEDURE — 36415 COLL VENOUS BLD VENIPUNCTURE: CPT

## 2019-10-20 PROCEDURE — 84484 ASSAY OF TROPONIN QUANT: CPT

## 2019-10-20 PROCEDURE — 93005 ELECTROCARDIOGRAM TRACING: CPT

## 2019-10-20 PROCEDURE — 81001 URINALYSIS AUTO W/SCOPE: CPT

## 2019-10-20 PROCEDURE — 99284 EMERGENCY DEPT VISIT MOD MDM: CPT

## 2019-10-20 PROCEDURE — 87086 URINE CULTURE/COLONY COUNT: CPT

## 2019-10-20 PROCEDURE — 80053 COMPREHEN METABOLIC PANEL: CPT

## 2019-10-20 PROCEDURE — 87077 CULTURE AEROBIC IDENTIFY: CPT

## 2019-10-20 PROCEDURE — 70450 CT HEAD/BRAIN W/O DYE: CPT

## 2019-10-20 PROCEDURE — 85025 COMPLETE CBC W/AUTO DIFF WBC: CPT

## 2019-10-20 PROCEDURE — 82962 GLUCOSE BLOOD TEST: CPT

## 2019-10-20 RX ORDER — CEFUROXIME AXETIL 500 MG/1
500 TABLET ORAL 2 TIMES DAILY
Qty: 14 TAB | Refills: 0 | Status: SHIPPED | OUTPATIENT
Start: 2019-10-20 | End: 2019-10-27

## 2019-10-20 NOTE — ED PROVIDER NOTES
Mr. Yanni Hylton is an 67-year-old male who presents to the ER with complaints of dizziness. He says that over the last few months he has felt dizzy when he stood up occasionally. Patient states he has been getting worse recently. Today, he stood up from the table. He suddenly felt generalized weakness and fell to the ground. He did not lose consciousness. He did not hit his head. He denies any injury other complaints in the fall. Patient said he has seen his primary care doctor about it and has not had a clear reason for his symptoms yet. He is scheduled for an echo in approximately 2 weeks. He denies any chest pain or trouble breathing. No nausea or vomiting. No pain in his belly. He denies any changes with his urine or bowel movements. Says that he feels well now and has no complaints in the ER. He does said he is also had a weight over the last 9 months or so. He has not had any new medications or any doses of his medications changed.            Past Medical History:   Diagnosis Date    Depression     Kidney disease        Past Surgical History:   Procedure Laterality Date    HX COLONOSCOPY  2018    HX LITHOTRIPSY      HX PROSTATECTOMY           Family History:   Problem Relation Age of Onset    Hypertension Mother         cerebral hemmorage     Stroke Mother     Prostate Cancer Father        Social History     Socioeconomic History    Marital status:      Spouse name: Not on file    Number of children: Not on file    Years of education: Not on file    Highest education level: Not on file   Occupational History    Not on file   Social Needs    Financial resource strain: Not on file    Food insecurity:     Worry: Not on file     Inability: Not on file    Transportation needs:     Medical: Not on file     Non-medical: Not on file   Tobacco Use    Smoking status: Former Smoker     Last attempt to quit: 2000     Years since quittin.8    Smokeless tobacco: Never Used Substance and Sexual Activity    Alcohol use: Yes     Drinks per session: 1 or 2     Binge frequency: Weekly    Drug use: No    Sexual activity: Never   Lifestyle    Physical activity:     Days per week: Not on file     Minutes per session: Not on file    Stress: Not on file   Relationships    Social connections:     Talks on phone: Not on file     Gets together: Not on file     Attends Alevism service: Not on file     Active member of club or organization: Not on file     Attends meetings of clubs or organizations: Not on file     Relationship status: Not on file    Intimate partner violence:     Fear of current or ex partner: Not on file     Emotionally abused: Not on file     Physically abused: Not on file     Forced sexual activity: Not on file   Other Topics Concern    Not on file   Social History Narrative    Not on file         ALLERGIES: Patient has no known allergies. Review of Systems   Constitutional: Negative for chills and fever. HENT: Negative for rhinorrhea and sore throat. Respiratory: Negative for cough and shortness of breath. Cardiovascular: Negative for chest pain. Gastrointestinal: Negative for abdominal pain, diarrhea, nausea and vomiting. Genitourinary: Negative for dysuria and hematuria. Musculoskeletal: Negative for arthralgias and myalgias. Skin: Negative for pallor and rash. Neurological: Positive for dizziness. Negative for weakness and light-headedness. All other systems reviewed and are negative. Vitals:    10/20/19 1053   BP: 103/76   Pulse: 81   Resp: 17   Temp: 97.5 °F (36.4 °C)   SpO2: 96%   Weight: 88.5 kg (195 lb)   Height: 5' 10\" (1.778 m)            Physical Exam     Vital signs reviewed. Nursing notes reviewed.     Const:  No acute distress, well developed, well nourished  Head:  Atraumatic, normocephalic  Eyes:  PERRL, conjunctiva normal, no scleral icterus  Neck:  Supple, trachea midline  Cardiovascular:  RRR, no murmurs, no gallops, no rubs  Resp:  No resp distress, no increased work of breathing, no wheezes, no rhonchi, no rales,  Abd:  Soft, non-tender, non-distended, no rebound, no guarding, no CVA tenderness  MSK:  No pedal edema, normal ROM  Neuro:  Alert and oriented x3, no cranial nerve defect  Skin:  Warm, dry, intact  Psych: normal mood and affect, behavior is normal, judgement and thought content is normal          MDM  Number of Diagnoses or Management Options  Acute cystitis without hematuria:   Dizziness:      Amount and/or Complexity of Data Reviewed  Clinical lab tests: ordered and reviewed  Tests in the radiology section of CPT®: ordered and reviewed  Review and summarize past medical records: yes    Patient Progress  Patient progress: stable          Pt. Presents to the ER with complaints of dizziness when he stands, which has worsened recently. Pt. Is well appearing in the ER. He is symptom free in the ER. NO arrhythmias in the ER which would cause his sx. Pt. Was found to have a UTI. This could be a contributing factor to his sx. Pt. Is also on several medications which could be factors as well. He is not sure why he takes flomax. I will get him to stop this for the next week. I will also start him on flomax. Pt. Has f/u scheduled to get an ECHO. Pt. Says that he feels well in the ER at the time of discharge. NO reported injuries or complaints from the fall. I will also add ceftin. Pt. To f/u with his PCP this week or return to the ER with worsening sx.        Procedures

## 2019-10-20 NOTE — ED NOTES
Pt transported to CT via wheelchair. Pt refusing to sit on stretcher and be hooked up to cardiac monitor.

## 2019-10-20 NOTE — ED NOTES
Pt given discharge instructions, patient education, prescriptions and follow-up information. Pt states understanding - all questions answered. Pt discharged to home in private vehicle, ambulatory. Pt A&Ox4, RA, with pain controlled. Pt refused discharge vitals.

## 2019-10-20 NOTE — DISCHARGE INSTRUCTIONS
Patient Education        Dizziness: Care Instructions  Your Care Instructions  Dizziness is the feeling of unsteadiness or fuzziness in your head. It is different than having vertigo, which is a feeling that the room is spinning or that you are moving or falling. It is also different from lightheadedness, which is the feeling that you are about to faint. It can be hard to know what causes dizziness. Some people feel dizzy when they have migraine headaches. Sometimes bouts of flu can make you feel dizzy. Some medical conditions, such as heart problems or high blood pressure, can make you feel dizzy. Many medicines can cause dizziness, including medicines for high blood pressure, pain, or anxiety. If a medicine causes your symptoms, your doctor may recommend that you stop or change the medicine. If it is a problem with your heart, you may need medicine to help your heart work better. If there is no clear reason for your symptoms, your doctor may suggest watching and waiting for a while to see if the dizziness goes away on its own. Follow-up care is a key part of your treatment and safety. Be sure to make and go to all appointments, and call your doctor if you are having problems. It's also a good idea to know your test results and keep a list of the medicines you take. How can you care for yourself at home? · If your doctor recommends or prescribes medicine, take it exactly as directed. Call your doctor if you think you are having a problem with your medicine. · Do not drive while you feel dizzy. · Try to prevent falls. Steps you can take include:  ? Using nonskid mats, adding grab bars near the tub, and using night-lights. ? Clearing your home so that walkways are free of anything you might trip on.  ? Letting family and friends know that you have been feeling dizzy. This will help them know how to help you. When should you call for help? Call 911 anytime you think you may need emergency care.  For example, call if:    · You passed out (lost consciousness).     · You have dizziness along with symptoms of a heart attack. These may include:  ? Chest pain or pressure, or a strange feeling in the chest.  ? Sweating. ? Shortness of breath. ? Nausea or vomiting. ? Pain, pressure, or a strange feeling in the back, neck, jaw, or upper belly or in one or both shoulders or arms. ? Lightheadedness or sudden weakness. ? A fast or irregular heartbeat.     · You have symptoms of a stroke. These may include:  ? Sudden numbness, tingling, weakness, or loss of movement in your face, arm, or leg, especially on only one side of your body. ? Sudden vision changes. ? Sudden trouble speaking. ? Sudden confusion or trouble understanding simple statements. ? Sudden problems with walking or balance. ? A sudden, severe headache that is different from past headaches.    Call your doctor now or seek immediate medical care if:    · You feel dizzy and have a fever, headache, or ringing in your ears.     · You have new or increased nausea and vomiting.     · Your dizziness does not go away or comes back.    Watch closely for changes in your health, and be sure to contact your doctor if:    · You do not get better as expected. Where can you learn more? Go to http://mey-mone.info/. Enter C427 in the search box to learn more about \"Dizziness: Care Instructions. \"  Current as of: June 26, 2019  Content Version: 12.2  © 8848-9328 [employer]. Care instructions adapted under license by [employer] (which disclaims liability or warranty for this information). If you have questions about a medical condition or this instruction, always ask your healthcare professional. [place] [address] any warranty or liability for your use of this information.          Patient Education        Urinary Tract Infections in Men: Care Instructions  Your Care Instructions    A urinary tract infection, or UTI, is a general term for an infection anywhere between the kidneys and the tip of the penis. UTIs can also be a result of a prostate problem. Most cause pain or burning when you urinate. Most UTIs are caused by bacteria and can be cured with antibiotics. It is important to complete your treatment so that the infection does not get worse. Follow-up care is a key part of your treatment and safety. Be sure to make and go to all appointments, and call your doctor if you are having problems. It's also a good idea to know your test results and keep a list of the medicines you take. How can you care for yourself at home? · Take your antibiotics as prescribed. Do not stop taking them just because you feel better. You need to take the full course of antibiotics. · Take your medicines exactly as prescribed. Your doctor may have prescribed a medicine, such as phenazopyridine (Pyridium), to help relieve pain when you urinate. This turns your urine orange. You may stop taking it when your symptoms get better. But be sure to take all of your antibiotics, which treat the infection. · Drink extra water for the next day or two. This will help make the urine less concentrated and help wash out the bacteria causing the infection. (If you have kidney, heart, or liver disease and have to limit your fluids, talk with your doctor before you increase your fluid intake.)  · Avoid drinks that are carbonated or have caffeine. They can irritate the bladder. · Urinate often. Try to empty your bladder each time. · To relieve pain, take a hot bath or lay a heating pad (set on low) over your lower belly or genital area. Never go to sleep with a heating pad in place. To help prevent UTIs  · Drink plenty of fluids, enough so that your urine is light yellow or clear like water. If you have kidney, heart, or liver disease and have to limit fluids, talk with your doctor before you increase the amount of fluids you drink.   · Urinate when you have the urge. Do not hold your urine for a long time. Urinate before you go to sleep. · Keep your penis clean. Catheter care  If you have a drainage tube (catheter) in place, the following steps will help you care for it. · Always wash your hands before and after touching your catheter. · Check the area around the urethra for inflammation or signs of infection. Signs of infection include irritated, swollen, red, or tender skin, or pus around the catheter. · Clean the area around the catheter with soap and water two times a day. Dry with a clean towel afterward. · Do not apply powder or lotion to the skin around the catheter. To empty the urine collection bag  · Wash your hands with soap and water. · Without touching the drain spout, remove the spout from its sleeve at the bottom of the collection bag. Open the valve on the spout. · Let the urine flow out of the bag and into the toilet or a container. Do not let the tubing or drain spout touch anything. · After you empty the bag, clean the end of the drain spout with tissue and water. Close the valve and put the drain spout back into its sleeve at the bottom of the collection bag. · Wash your hands with soap and water. When should you call for help? Call your doctor now or seek immediate medical care if:    · Symptoms such as a fever, chills, nausea, or vomiting get worse or happen for the first time.     · You have new pain in your back just below your rib cage. This is called flank pain.     · There is new blood or pus in your urine.     · You are not able to take or keep down your antibiotics.    Watch closely for changes in your health, and be sure to contact your doctor if:    · You are not getting better after taking an antibiotic for 2 days.     · Your symptoms go away but then come back. Where can you learn more? Go to http://mey-mone.info/.   Enter F115 in the search box to learn more about \"Urinary Tract Infections in Men: Care Instructions. \"  Current as of: December 19, 2018  Content Version: 12.2  © 2991-3067 [employer]. Care instructions adapted under license by [employer] (which disclaims liability or warranty for this information). If you have questions about a medical condition or this instruction, always ask your healthcare professional. [place] [address] any warranty or liability for your use of this information.

## 2019-10-22 LAB
BACTERIA SPEC CULT: ABNORMAL
BACTERIA SPEC CULT: ABNORMAL
CC UR VC: ABNORMAL
SERVICE CMNT-IMP: ABNORMAL

## 2019-10-23 ENCOUNTER — OFFICE VISIT (OUTPATIENT)
Dept: INTERNAL MEDICINE CLINIC | Age: 84
End: 2019-10-23

## 2019-10-23 VITALS
HEIGHT: 70 IN | WEIGHT: 197 LBS | DIASTOLIC BLOOD PRESSURE: 80 MMHG | TEMPERATURE: 98.2 F | BODY MASS INDEX: 28.2 KG/M2 | OXYGEN SATURATION: 96 % | RESPIRATION RATE: 12 BRPM | HEART RATE: 67 BPM | SYSTOLIC BLOOD PRESSURE: 134 MMHG

## 2019-10-23 DIAGNOSIS — N18.30 CRI (CHRONIC RENAL INSUFFICIENCY), STAGE 3 (MODERATE) (HCC): ICD-10-CM

## 2019-10-23 DIAGNOSIS — N40.1 BENIGN PROSTATIC HYPERPLASIA WITH NOCTURIA: ICD-10-CM

## 2019-10-23 DIAGNOSIS — R35.1 BENIGN PROSTATIC HYPERPLASIA WITH NOCTURIA: ICD-10-CM

## 2019-10-23 DIAGNOSIS — I10 ESSENTIAL HYPERTENSION: ICD-10-CM

## 2019-10-23 DIAGNOSIS — B96.5 PSEUDOMONAS URINARY TRACT INFECTION: ICD-10-CM

## 2019-10-23 DIAGNOSIS — N39.0 PSEUDOMONAS URINARY TRACT INFECTION: ICD-10-CM

## 2019-10-23 DIAGNOSIS — I95.1 ORTHOSTATIC HYPOTENSION: Primary | ICD-10-CM

## 2019-10-23 RX ORDER — GRANULES FOR ORAL 3 G/1
3 POWDER ORAL ONCE
Qty: 1 PACKET | Refills: 0 | Status: SHIPPED | OUTPATIENT
Start: 2019-10-23 | End: 2019-10-24

## 2019-10-23 NOTE — PROGRESS NOTES
I called and spoke with patient. Informed of culture results. Offered to call in new rx.  Pt stated he was going to see a new doctor today (Dr. Marleny Ortega) but did not have full name

## 2019-10-24 ENCOUNTER — TELEPHONE (OUTPATIENT)
Dept: INTERNAL MEDICINE CLINIC | Age: 84
End: 2019-10-24

## 2019-10-24 RX ORDER — GRANULES FOR ORAL 3 G/1
3 POWDER ORAL ONCE
Qty: 1 PACKET | Refills: 0 | Status: SHIPPED | OUTPATIENT
Start: 2019-10-24 | End: 2019-10-24

## 2019-10-24 NOTE — TELEPHONE ENCOUNTER
Patient call wanting  to write a script for fosfomycin. This was given to him in the ER even though the patient believes that  called in into his pharmacy. Please call patient back at [phone]

## 2019-10-24 NOTE — TELEPHONE ENCOUNTER
Per [facility] pt was in ER for UTI, ER doctor sent in fosfomycin based on culture and sensitivity results. Attempted to return call to patient and advise that it was sent in yesterday and he is in fact to pick it up and take. [facility] [place] Sites advising him of this and advised return call to office if any further questions.

## 2019-10-24 NOTE — TELEPHONE ENCOUNTER
Patient told me at his visit that he had no idea what this medication was and said he's never taken it, also was not on his printed med list that he personally brought in so I removed it from his med rec. Now he is calling asking for it to be sent to the pharmacy? ?? Did you discuss this medication with him at all during his NP appt?

## 2019-10-24 NOTE — PROGRESS NOTES
HPI:  Mayda Downey is a 81y.o. year old male who is here for a new patient appointment to me in transfer from Dr. Jorge Nelson. He has been frustrated with the length of time it takes to get return phone calls. He has had over the last few months ongoing issues with dizziness. It only seems to occur when he stands up. He had an episode over the weekend when he fell to the ground. He was seen in the emergency room at [facility] and had a CT scan of the head, lab work, Juan Antonio Gifty with a urinary tract infection. He received a phone call today that his antibiotic was not appropriate for the Pseudomonas that it was growing. He was given a prescription today for Monurol but has not picked it up yet. He stopped taking Flomax over the weekend at the instruction of the emergency room. He does feel that his dizziness may be slightly better over the last 3 days. No other falls. No headaches. No chest pains or shortness of breath. No cough or wheeze. He has nocturia about once per night. This seems to be unchanged over the last 4 days. No numbness, tingling, or weakness. His blood pressures have been elevated recently. He was off hydrochlorothiazide for a short period of time and did not note any change in his dizziness. Past Medical History:   Diagnosis Date    Calculus of kidney >20 years    Chronic kidney disease ~ 1995    Chronic obstructive pulmonary disease ([address] 1995    Removed 2019    CRI (chronic renal insufficiency), stage 3 (moderate) ([address] 10/23/2019    Depression     Essential hypertension 10/23/2019    Hypertension >30 years    Kidney disease        Past Surgical History:   Procedure Laterality Date    HX CHOLECYSTECTOMY      HX COLONOSCOPY  2018    HX LITHOTRIPSY      HX PROSTATECTOMY         Prior to Admission medications    Medication Sig Start Date End Date Taking? Authorizing Provider   hydroCHLOROthiazide (HYDRODIURIL) 12.5 mg tablet Take 12.5 mg by mouth daily.    Yes Provider, Historical   venlafaxine-SR (EFFEXOR-XR) 150 mg capsule Take 1 Cap by mouth daily. Patient taking differently: Take 75 mg by mouth daily. 10/3/19  Yes Katina Ordaz MD   donepezil (ARICEPT) 10 mg tablet TAKE 1 TABLET BY MOUTH  NIGHTLY 19  Yes Franca Hardy MD   traZODone (DESYREL) 100 mg tablet Take 1.5 Tabs by mouth nightly for 90 days. 19 Yes Katina Ordaz MD   finasteride (PROSCAR) 5 mg tablet TK 1 T PO  QAM 19  Yes Provider, Historical   metoprolol succinate (TOPROL-XL) 25 mg XL tablet TAKE 1/2 TABLET BY MOUTH  DAILY 19  Yes Katina Ordaz MD   melatonin tab tablet Take 10 mg by mouth nightly. Yes Provider, Historical   multivitamin with iron tablet Take 1 Tab by mouth daily. Yes Provider, Historical   Adonica Hojami 038-72-64 mg-mg-million tab Take  by mouth. Yes Provider, Historical   fosfomycin (MONUROL) 3 gram pack oral packet Take 1 Packet by mouth once for 1 dose. 10/23/19 10/23/19  Case Schmidt PA-C   cefUROXime (CEFTIN) 500 mg tablet Take 1 Tab by mouth two (2) times a day for 7 days. 10/20/19 10/27/19  Orestes Arteaga MD   sildenafil citrate (VIAGRA) 25 mg tablet Take 1 Tab by mouth as needed (prior to sexual activity).  19   Katina Ordaz MD       Social History     Socioeconomic History    Marital status:      Spouse name: Not on file    Number of children: Not on file    Years of education: Not on file    Highest education level: Not on file   Occupational History    Not on file   Social Needs    Financial resource strain: Not on file    Food insecurity:     Worry: Not on file     Inability: Not on file    Transportation needs:     Medical: Not on file     Non-medical: Not on file   Tobacco Use    Smoking status: Former Smoker     Packs/day: 1.00     Years: 45.00     Pack years: 45.00     Last attempt to quit:      Years since quittin.8    Smokeless tobacco: Never Used   Substance and Sexual Activity    Alcohol use: Yes     Alcohol/week: 0.0 standard drinks     Types: 4 Glasses of wine per week     Drinks per session: 1 or 2     Binge frequency: Weekly    Drug use: No    Sexual activity: Never   Lifestyle    Physical activity:     Days per week: Not on file     Minutes per session: Not on file    Stress: Not on file   Relationships    Social connections:     Talks on phone: Not on file     Gets together: Not on file     Attends Christianity service: Not on file     Active member of club or organization: Not on file     Attends meetings of clubs or organizations: Not on file     Relationship status: Not on file    Intimate partner violence:     Fear of current or ex partner: Not on file     Emotionally abused: Not on file     Physically abused: Not on file     Forced sexual activity: Not on file   Other Topics Concern    Not on file   Social History Narrative    Not on file          ROS  Per HPI    Visit Vitals  /80   Pulse 67   Temp 98.2 °F (36.8 °C) (Oral)   Resp 12   Ht 5' 10\" (1.778 m)   Wt 197 lb (89.4 kg)   SpO2 96%   BMI 28.27 kg/m²         Physical Exam   Physical Examination: General appearance - alert, well appearing, and in no distress  Ears - bilateral TM's and external ear canals normal  Mouth - mucous membranes moist, pharynx normal without lesions  Neck - supple, no significant adenopathy  Lymphatics - no palpable lymphadenopathy, no hepatosplenomegaly  Chest - clear to auscultation, no wheezes, rales or rhonchi, symmetric air entry  Heart - normal rate and regular rhythm  Abdomen - soft, nontender, nondistended, no masses or organomegaly  Neurological - alert, oriented, normal speech, no focal findings or movement disorder noted, motor and sensory grossly normal bilaterally  Musculoskeletal - no joint tenderness, deformity or swelling  Extremities - peripheral pulses normal, no pedal edema, no clubbing or cyanosis      Assessment/Plan:  Diagnoses and all orders for this visit:    1. Orthostatic hypotension -? Improved off Flomax. At this point will give it another week and see if his symptoms continue to improve. If not, consider reducing his hydrochlorothiazide. Given his syncopal episode or near syncope, will tolerate slightly higher blood pressure readings. Orthostatic precautions as well to prevent falls. 2. Essential hypertension -per above. 3. CRI (chronic renal insufficiency), stage 3 (moderate) ([facility]) -has a follow-up visit with Dr. Aida Livingston coming up. Will check labs at repeat. 4. Pseudomonas urinary tract infection -agree with change in antibiotics. He will see urology for follow-up as needed. 5. Benign prostatic hyperplasia with nocturia              Advised him to call back or return to office if symptoms worsen/change/persist.  Discussed expected course/resolution/complications of diagnosis in detail with patient. Medication risks/benefits/costs/interactions/alternatives discussed with patient. He was given an after visit summary which includes diagnoses, current medications, & vitals. He expressed understanding with the diagnosis and plan.

## 2019-11-01 ENCOUNTER — OFFICE VISIT (OUTPATIENT)
Dept: NEUROLOGY | Age: 84
End: 2019-11-01

## 2019-11-01 VITALS
OXYGEN SATURATION: 97 % | BODY MASS INDEX: 27.92 KG/M2 | HEART RATE: 88 BPM | WEIGHT: 195 LBS | DIASTOLIC BLOOD PRESSURE: 78 MMHG | HEIGHT: 70 IN | SYSTOLIC BLOOD PRESSURE: 118 MMHG | RESPIRATION RATE: 16 BRPM

## 2019-11-01 DIAGNOSIS — G31.84 MILD COGNITIVE IMPAIRMENT: Primary | ICD-10-CM

## 2019-11-01 RX ORDER — SILODOSIN 8 MG/1
8 CAPSULE ORAL
COMMUNITY
End: 2020-01-17 | Stop reason: ALTCHOICE

## 2019-11-01 NOTE — PATIENT INSTRUCTIONS
PRESCRIPTION REFILL POLICY [facility] Statement to Patients April 1, 2014 In an effort to ensure the large volume of patient prescription refills is processed in the most efficient and expeditious manner, we are asking our patients to assist us by calling your Pharmacy for all prescription refills, this will include also your  Mail Order Pharmacy. The pharmacy will contact our office electronically to continue the refill process. Please do not wait until the last minute to call your pharmacy. We need at least 48 hours (2days) to fill prescriptions. We also encourage you to call your pharmacy before going to  your prescription to make sure it is ready. With regard to controlled substance prescription refill requests (narcotic refills) that need to be picked up at our office, we ask your cooperation by providing us with at least 72 hours (3days) notice that you will need a refill. We will not refill narcotic prescription refill requests after 4:00pm on any weekday, Monday through Thursday, or after 2:00pm on Fridays, or on the weekends. We encourage everyone to explore another way of getting your prescription refill request processed using [employer], our patient web portal through our electronic medical record system. [employer] is an efficient and effective way to communicate your medication request directly to the office and  downloadable as an john on your smart phone . [employer] also features a review functionality that allows you to view your medication list as well as leave messages for your physician. Are you ready to get connected? If so please review the attatched instructions or speak to any of our staff to get you set up right away! Thank you so much for your cooperation. Should you have any questions please contact our Practice Administrator. The Physicians and Staff,  [facility]

## 2019-11-01 NOTE — PROGRESS NOTES
Chief Complaint   Patient presents with    Neurologic Problem         HISTORY OF PRESENT ILLNESS  Leroy Santiago came back for follow-up. He says that overall he is doing better. He is less forgetful and is having less issues with conversations etc.   He was suspected to have mild cognitive impairment at the last visit and was started on donepezil. He takes it regularly and denies any side effects. His work-up was negative. He remains independent with all activities of daily living. RECAP  He came in for neurological evaluation requested by Dr. Nia Rivera. For the past couple years, he has been having difficulties with short-term memory. He started noticing it more ever since his wife passed away last year. He says that he will get up to go do something and then all of a sudden he will forget what he was about to do. He has also been having difficulty finding words during conversations and tends to forget simple things. He remains independent with all activities of daily living. He now has a new girlfriend who is also the same age as him. She has not expressed any concern about his cognitive functioning. He drives, denies ever getting lost.  Brenda Montezher his money and finances without problem. Lives alone and pays his bills on time. Current Outpatient Medications   Medication Sig    silodosin (RAPAFLO) 8 mg capsule Take 8 mg by mouth daily (with breakfast).  hydroCHLOROthiazide (HYDRODIURIL) 12.5 mg tablet Take 12.5 mg by mouth daily.  venlafaxine-SR (EFFEXOR-XR) 150 mg capsule Take 1 Cap by mouth daily. (Patient taking differently: Take 75 mg by mouth daily.)    donepezil (ARICEPT) 10 mg tablet TAKE 1 TABLET BY MOUTH  NIGHTLY    traZODone (DESYREL) 100 mg tablet Take 1.5 Tabs by mouth nightly for 90 days.     finasteride (PROSCAR) 5 mg tablet TK 1 T PO  QAM    metoprolol succinate (TOPROL-XL) 25 mg XL tablet TAKE 1/2 TABLET BY MOUTH  DAILY    sildenafil citrate (VIAGRA) 25 mg tablet Take 1 Tab by mouth as needed (prior to sexual activity).  melatonin tab tablet Take 10 mg by mouth nightly.  multivitamin with iron tablet Take 1 Tab by mouth daily.  AZO CRANBERRY 488-11-50 mg-mg-million tab Take  by mouth. No current facility-administered medications for this visit. PHYSICAL EXAMINATION:    Visit Vitals  /78   Pulse 88   Resp 16   Ht 5' 10\" (1.778 m)   Wt 88.5 kg (195 lb)   SpO2 97%   BMI 27.98 kg/m²       NEUROLOGICAL EXAMINATION:     Mental Status:   Alert and oriented to person, place, and time. He scored 23/30 on [facility] cognitive assessment. His short-term/delayed recall was 0/5   Speech is fluent. Cranial Nerves:    II, III, IV, VI:  Visual acuity grossly intact. Visual fields are normal.    Pupils are equal, round, and reactive to light and accommodation. Extra-ocular movements are full and fluid. .   V-XII: Hearing is grossly intact. Facial features are symmetric, with normal sensation and strength. The palate rises symmetrically and the tongue protrudes midline. Sternocleidomastoids 5/5. Motor Examination: Normal tone, bulk, and strength. 5/5 muscle strength throughout. Sensory exam:  Normal throughout to pinprick, temperature, and vibration sense. Normal proprioception. Coordination:  Finger to nose and rapid arm movement testing was normal.   No resting or intention tremor    Gait and Station:  Steady. Normal arm swing. No Rhomberg or pronator drift. No muscle wasting or fasiculations noted. Reflexes:  DTRs 2+ throughout. Toes downgoing.         LABS / IMAGING  Lab Results   Component Value Date/Time    WBC 9.6 10/20/2019 11:08 AM    HGB 16.2 10/20/2019 11:08 AM    HCT 48.4 10/20/2019 11:08 AM    PLATELET 875 (L) 26/63/5285 11:08 AM    MCV 90.0 10/20/2019 11:08 AM     Lab Results   Component Value Date/Time    Sodium 139 10/20/2019 11:08 AM    Potassium 3.7 10/20/2019 11:08 AM    Chloride 103 10/20/2019 11:08 AM    CO2 31 10/20/2019 11:08 AM    Anion gap 5 10/20/2019 11:08 AM    Glucose 94 10/20/2019 11:08 AM    BUN 47 (H) 10/20/2019 11:08 AM    Creatinine 1.97 (H) 10/20/2019 11:08 AM    BUN/Creatinine ratio 24 (H) 10/20/2019 11:08 AM    GFR est AA 40 (L) 10/20/2019 11:08 AM    GFR est non-AA 33 (L) 10/20/2019 11:08 AM    Calcium 9.9 10/20/2019 11:08 AM    Bilirubin, total 0.6 10/20/2019 11:08 AM    AST (SGOT) 16 10/20/2019 11:08 AM    Alk. phosphatase 57 10/20/2019 11:08 AM    Protein, total 7.2 10/20/2019 11:08 AM    Albumin 3.6 10/20/2019 11:08 AM    Globulin 3.6 10/20/2019 11:08 AM    A-G Ratio 1.0 (L) 10/20/2019 11:08 AM    ALT (SGPT) 30 10/20/2019 11:08 AM     Lab Results   Component Value Date/Time    TSH 1.210 10/18/2018 08:13 AM     Lab Results   Component Value Date/Time    Vitamin B12 1,175 03/28/2019 02:09 PM     CT Results (most recent):  Results from Hospital Encounter encounter on 10/20/19   CT HEAD WO CONT    Narrative EXAM: CT HEAD WO CONT    INDICATION: dizzy    COMPARISON: CT 4/4/2019. CONTRAST: None. TECHNIQUE: Unenhanced CT of the head was performed using 5 mm images. Brain and  bone windows were generated. CT dose reduction was achieved through use of a  standardized protocol tailored for this examination and automatic exposure  control for dose modulation. FINDINGS:  Mild prominence the ventricles and cortical sulci consistent with atrophy is  again shown. Mild bilateral periventricular diminished attenuation in the  cerebrum is unchanged, consistent with chronic small vessel ischemic disease the  white matter. There is no intracranial hemorrhage, extra-axial collection, mass,  mass effect or midline shift. The basilar cisterns are open. No acute infarct  is identified. The bone windows demonstrate no abnormalities. The visualized  portions of the paranasal sinuses and mastoid air cells are clear. Impression IMPRESSION: No acute findings.            ASSESSMENT    ICD-10-CM ICD-9-CM    1. Mild cognitive impairment G31.84 331.83        DISCUSSION  Mr. [place] Roblero has has mild cognitive impairment with significant difficulty with short-term recall but does well otherwise on cognitive screening. He has remained stable in the last 6 months  Continue donepezil 10 mg daily  He was again encouraged to do regular physical exercise and different resources to do memory exercises were discussed  Follow-up in 6 months    I spent greater than 25 minutes with the patient and more than 50% of the time was spent in counseling and coordination of care. Javier Shelton MD  Diplomate, American Board of Psychiatry & Neurology (Neurology)  [place] Board of Psychiatry & Neurology (Clinical Neurophysiology)  Diplomate, American Board of Electrodiagnostic Medicine  This note will not be viewable in [address]

## 2019-11-01 NOTE — PROGRESS NOTES
Mr. Elliot Gómez presents today to follow up mild cognitive impairment. Depression screening done on patient.

## 2019-12-11 ENCOUNTER — OFFICE VISIT (OUTPATIENT)
Dept: NEUROLOGY | Age: 84
End: 2019-12-11

## 2019-12-11 VITALS
RESPIRATION RATE: 16 BRPM | SYSTOLIC BLOOD PRESSURE: 104 MMHG | DIASTOLIC BLOOD PRESSURE: 80 MMHG | OXYGEN SATURATION: 98 % | HEART RATE: 66 BPM | HEIGHT: 70 IN | BODY MASS INDEX: 27.2 KG/M2 | WEIGHT: 190 LBS

## 2019-12-11 DIAGNOSIS — G31.84 MILD COGNITIVE IMPAIRMENT: Primary | ICD-10-CM

## 2019-12-11 NOTE — PROGRESS NOTES
Chief Complaint   Patient presents with    Memory Loss         HISTORY OF PRESENT ILLNESS  Dia Mora came back for follow-up. He was last seen about 6 weeks ago. He has noticed that he has become more forgetful over the last few weeks and got concerned about it and wanted to be reevaluated. He will stand up to do something and will forget what he was about to do. He came along with his daughter today who tells me that overall she has noticed that he may be somewhat better ever since he started taking donepezil   He has also been diagnosed with UTI and was recently prescribed a course of antibiotics for 2 weeks  Remains independent with all activities of daily living    RECAP  He came in for neurological evaluation requested by Dr. Taylor Ruiz. For the past couple years, he has been having difficulties with short-term memory. He started noticing it more ever since his wife passed away last year. He says that he will get up to go do something and then all of a sudden he will forget what he was about to do. He has also been having difficulty finding words during conversations and tends to forget simple things. He remains independent with all activities of daily living. He now has a new girlfriend who is also the same age as him. She has not expressed any concern about his cognitive functioning. He drives, denies ever getting lost.  Crystal Busman his money and finances without problem. Lives alone and pays his bills on time. Current Outpatient Medications   Medication Sig    silodosin (RAPAFLO) 8 mg capsule Take 8 mg by mouth daily (with breakfast).  hydroCHLOROthiazide (HYDRODIURIL) 12.5 mg tablet Take 12.5 mg by mouth daily.  venlafaxine-SR (EFFEXOR-XR) 150 mg capsule Take 1 Cap by mouth daily.  (Patient taking differently: Take 75 mg by mouth daily.)    donepezil (ARICEPT) 10 mg tablet TAKE 1 TABLET BY MOUTH  NIGHTLY    finasteride (PROSCAR) 5 mg tablet TK 1 T PO  QAM    metoprolol succinate (TOPROL-XL) 25 mg XL tablet TAKE 1/2 TABLET BY MOUTH  DAILY    melatonin tab tablet Take 10 mg by mouth nightly.  multivitamin with iron tablet Take 1 Tab by mouth daily.  AZO CRANBERRY 762-61-86 mg-mg-million tab Take  by mouth.  sildenafil citrate (VIAGRA) 25 mg tablet Take 1 Tab by mouth as needed (prior to sexual activity). No current facility-administered medications for this visit. PHYSICAL EXAMINATION:    Visit Vitals  /80   Pulse 66   Resp 16   Ht 5' 10\" (1.778 m)   Wt 86.2 kg (190 lb)   SpO2 98%   BMI 27.26 kg/m²       NEUROLOGICAL EXAMINATION:     Mental Status:   Alert and oriented to person, place, and time. He scored 24/30 on [place] cognitive assessment. His short-term/delayed recall was 1/5   Speech is fluent. Cranial Nerves:    II, III, IV, VI:  Visual acuity grossly intact. Visual fields are normal.    Pupils are equal, round, and reactive to light and accommodation. Extra-ocular movements are full and fluid. .   V-XII: Hearing is grossly intact. Facial features are symmetric, with normal sensation and strength. The palate rises symmetrically and the tongue protrudes midline. Sternocleidomastoids 5/5. Motor Examination: Normal tone, bulk, and strength. 5/5 muscle strength throughout. Sensory exam:  Normal throughout to pinprick, temperature, and vibration sense. Normal proprioception. Coordination:  Finger to nose and rapid arm movement testing was normal.   No resting or intention tremor    Gait and Station:  Steady. Normal arm swing. No Rhomberg or pronator drift. No muscle wasting or fasiculations noted. Reflexes:  DTRs 2+ throughout. Toes downgoing.         LABS / IMAGING  Lab Results   Component Value Date/Time    WBC 9.6 10/20/2019 11:08 AM    HGB 16.2 10/20/2019 11:08 AM    HCT 48.4 10/20/2019 11:08 AM    PLATELET 989 (L) 37/73/2358 11:08 AM    MCV 90.0 10/20/2019 11:08 AM     Lab Results   Component Value Date/Time Sodium 139 10/20/2019 11:08 AM    Potassium 3.7 10/20/2019 11:08 AM    Chloride 103 10/20/2019 11:08 AM    CO2 31 10/20/2019 11:08 AM    Anion gap 5 10/20/2019 11:08 AM    Glucose 94 10/20/2019 11:08 AM    BUN 47 (H) 10/20/2019 11:08 AM    Creatinine 1.97 (H) 10/20/2019 11:08 AM    BUN/Creatinine ratio 24 (H) 10/20/2019 11:08 AM    GFR est AA 40 (L) 10/20/2019 11:08 AM    GFR est non-AA 33 (L) 10/20/2019 11:08 AM    Calcium 9.9 10/20/2019 11:08 AM    Bilirubin, total 0.6 10/20/2019 11:08 AM    AST (SGOT) 16 10/20/2019 11:08 AM    Alk. phosphatase 57 10/20/2019 11:08 AM    Protein, total 7.2 10/20/2019 11:08 AM    Albumin 3.6 10/20/2019 11:08 AM    Globulin 3.6 10/20/2019 11:08 AM    A-G Ratio 1.0 (L) 10/20/2019 11:08 AM    ALT (SGPT) 30 10/20/2019 11:08 AM     Lab Results   Component Value Date/Time    TSH 1.210 10/18/2018 08:13 AM     Lab Results   Component Value Date/Time    Vitamin B12 1,175 03/28/2019 02:09 PM     CT Results (most recent):  Results from Hospital Encounter encounter on 10/20/19   CT HEAD WO CONT    Narrative EXAM: CT HEAD WO CONT    INDICATION: dizzy    COMPARISON: CT 4/4/2019. CONTRAST: None. TECHNIQUE: Unenhanced CT of the head was performed using 5 mm images. Brain and  bone windows were generated. CT dose reduction was achieved through use of a  standardized protocol tailored for this examination and automatic exposure  control for dose modulation. FINDINGS:  Mild prominence the ventricles and cortical sulci consistent with atrophy is  again shown. Mild bilateral periventricular diminished attenuation in the  cerebrum is unchanged, consistent with chronic small vessel ischemic disease the  white matter. There is no intracranial hemorrhage, extra-axial collection, mass,  mass effect or midline shift. The basilar cisterns are open. No acute infarct  is identified. The bone windows demonstrate no abnormalities.  The visualized  portions of the paranasal sinuses and mastoid air cells are clear. Impression IMPRESSION: No acute findings. ASSESSMENT    ICD-10-CM ICD-9-CM    1. Mild cognitive impairment G31.84 331.83        DISCUSSION  Mr. Rosalinda Alfaro has has mild cognitive impairment with significant difficulty with short-term recall but does well otherwise on cognitive screening. He has remained stable in the last 6 months and did slightly better today than he did 6 weeks ago  He was reassured in this regard and fluctuations in his memory are likely due to underlying UTI  Continue donepezil 10 mg daily  He was again encouraged to do regular physical exercise and different resources to do memory exercises were discussed  Follow-up in 4 months    I spent greater than 25 minutes with the patient and more than 50% of the time was spent in counseling and coordination of care. Sofie Johnson MD  Diplomate, American Board of Psychiatry & Neurology (Neurology)  [place] Board of Psychiatry & Neurology (Clinical Neurophysiology)  Diplomate, American Board of Electrodiagnostic Medicine  This note will not be viewable in [address]

## 2019-12-11 NOTE — PATIENT INSTRUCTIONS
PRESCRIPTION REFILL POLICY [facility] Statement to Patients April 1, 2014 In an effort to ensure the large volume of patient prescription refills is processed in the most efficient and expeditious manner, we are asking our patients to assist us by calling your Pharmacy for all prescription refills, this will include also your  Mail Order Pharmacy. The pharmacy will contact our office electronically to continue the refill process. Please do not wait until the last minute to call your pharmacy. We need at least 48 hours (2days) to fill prescriptions. We also encourage you to call your pharmacy before going to  your prescription to make sure it is ready. With regard to controlled substance prescription refill requests (narcotic refills) that need to be picked up at our office, we ask your cooperation by providing us with at least 72 hours (3days) notice that you will need a refill. We will not refill narcotic prescription refill requests after 4:00pm on any weekday, Monday through Thursday, or after 2:00pm on Fridays, or on the weekends. We encourage everyone to explore another way of getting your prescription refill request processed using [employer], our patient web portal through our electronic medical record system. [employer] is an efficient and effective way to communicate your medication request directly to the office and  downloadable as an john on your smart phone . [employer] also features a review functionality that allows you to view your medication list as well as leave messages for your physician. Are you ready to get connected? If so please review the attatched instructions or speak to any of our staff to get you set up right away! Thank you so much for your cooperation. Should you have any questions please contact our Practice Administrator. The Physicians and Staff,  [facility]

## 2020-01-05 DIAGNOSIS — Z86.79 HISTORY OF ATRIAL FIBRILLATION: ICD-10-CM

## 2020-01-06 DIAGNOSIS — G31.84 MILD COGNITIVE IMPAIRMENT: ICD-10-CM

## 2020-01-06 RX ORDER — TRAZODONE HYDROCHLORIDE 100 MG/1
TABLET ORAL
Qty: 90 TAB | Refills: 2 | Status: SHIPPED | OUTPATIENT
Start: 2020-01-06 | End: 2020-01-09 | Stop reason: SDUPTHER

## 2020-01-06 RX ORDER — METOPROLOL SUCCINATE 25 MG/1
TABLET, EXTENDED RELEASE ORAL
Qty: 45 TAB | Refills: 1 | Status: SHIPPED | OUTPATIENT
Start: 2020-01-06 | End: 2020-01-12 | Stop reason: SDUPTHER

## 2020-01-06 RX ORDER — DONEPEZIL HYDROCHLORIDE 10 MG/1
TABLET, FILM COATED ORAL
Qty: 90 TAB | Refills: 2 | Status: CANCELLED | OUTPATIENT
Start: 2020-01-06

## 2020-01-06 NOTE — TELEPHONE ENCOUNTER
Requested Prescriptions     Pending Prescriptions Disp Refills    donepezil (ARICEPT) 10 mg tablet 90 Tab 2     Pt said his new pharmacy is [facility].  I could not find it in the system

## 2020-01-07 ENCOUNTER — TELEPHONE (OUTPATIENT)
Dept: NEUROLOGY | Age: 85
End: 2020-01-07

## 2020-01-07 DIAGNOSIS — G31.84 MILD COGNITIVE IMPAIRMENT: ICD-10-CM

## 2020-01-07 RX ORDER — DONEPEZIL HYDROCHLORIDE 10 MG/1
10 TABLET, FILM COATED ORAL
Qty: 90 TAB | Refills: 2 | Status: SHIPPED | OUTPATIENT
Start: 2020-01-07 | End: 2020-07-24 | Stop reason: SDUPTHER

## 2020-01-07 NOTE — TELEPHONE ENCOUNTER
----- Message from Jeni Bond sent at 1/7/2020 10:27 AM EST -----  Regarding: Dr. Lisa Mcwilliams (if not patient):      Relationship of caller (if not patient):      Best contact number(s):[phone]      Name of medication and dosage if known:      Is patient out of this medication (yes/no):      Pharmacy name:[facility] listed in chart? (yes/no):  Pharmacy phone number:(fax) [phone]      Details to clarify the request:Pt called to give fax number for his medication.       Jeni Bond

## 2020-01-08 DIAGNOSIS — F51.01 PRIMARY INSOMNIA: ICD-10-CM

## 2020-01-08 DIAGNOSIS — Z86.79 HISTORY OF ATRIAL FIBRILLATION: ICD-10-CM

## 2020-01-08 RX ORDER — VENLAFAXINE HYDROCHLORIDE 150 MG/1
150 CAPSULE, EXTENDED RELEASE ORAL DAILY
Qty: 90 CAP | Refills: 1 | Status: SHIPPED | COMMUNITY
Start: 2020-01-08 | End: 2020-01-12 | Stop reason: SDUPTHER

## 2020-01-08 RX ORDER — TRAZODONE HYDROCHLORIDE 100 MG/1
TABLET ORAL
Qty: 90 TAB | Refills: 2 | OUTPATIENT
Start: 2020-01-08

## 2020-01-08 RX ORDER — METOPROLOL SUCCINATE 25 MG/1
TABLET, EXTENDED RELEASE ORAL
Qty: 45 TAB | Refills: 1 | OUTPATIENT
Start: 2020-01-08

## 2020-01-09 ENCOUNTER — TELEPHONE (OUTPATIENT)
Dept: INTERNAL MEDICINE CLINIC | Age: 85
End: 2020-01-09

## 2020-01-09 RX ORDER — TRAZODONE HYDROCHLORIDE 100 MG/1
100 TABLET ORAL
Qty: 90 TAB | Refills: 1 | Status: SHIPPED | COMMUNITY
Start: 2020-01-09 | End: 2020-04-02

## 2020-01-09 NOTE — TELEPHONE ENCOUNTER
Spoke with pt in ref to trazodone rx that was sent to [facility]. He states he now has [facility] for his mailorder. Apologized and will send new rx.

## 2020-01-09 NOTE — TELEPHONE ENCOUNTER
Pan Curran \"[place]\" (Self) [phone] (H)     Pt requesting a call back regarding his trazodone rx

## 2020-01-12 DIAGNOSIS — F51.01 PRIMARY INSOMNIA: ICD-10-CM

## 2020-01-12 DIAGNOSIS — Z86.79 HISTORY OF ATRIAL FIBRILLATION: ICD-10-CM

## 2020-01-13 RX ORDER — VENLAFAXINE HYDROCHLORIDE 150 MG/1
150 CAPSULE, EXTENDED RELEASE ORAL DAILY
Qty: 90 CAP | Refills: 1 | Status: SHIPPED | COMMUNITY
Start: 2020-01-13 | End: 2020-02-17 | Stop reason: DRUGHIGH

## 2020-01-13 RX ORDER — METOPROLOL SUCCINATE 25 MG/1
12.5 TABLET, EXTENDED RELEASE ORAL DAILY
Qty: 45 TAB | Refills: 1 | Status: SHIPPED | COMMUNITY
Start: 2020-01-13 | End: 2021-01-04

## 2020-01-13 NOTE — TELEPHONE ENCOUNTER
Orders Placed This Encounter    metoprolol succinate (TOPROL-XL) 25 mg XL tablet     Sig: Take 0.5 Tabs by mouth daily. Dispense:  45 Tab     Refill:  1    venlafaxine-SR (EFFEXOR-XR) 150 mg capsule     Sig: Take 1 Cap by mouth daily. Dispense:  90 Cap     Refill:  1     The above orders were approved via [facility] per Dr. Janine Jc, III.

## 2020-01-17 ENCOUNTER — OFFICE VISIT (OUTPATIENT)
Dept: INTERNAL MEDICINE CLINIC | Age: 85
End: 2020-01-17

## 2020-01-17 VITALS
OXYGEN SATURATION: 98 % | WEIGHT: 199.4 LBS | SYSTOLIC BLOOD PRESSURE: 121 MMHG | HEART RATE: 76 BPM | TEMPERATURE: 97.4 F | DIASTOLIC BLOOD PRESSURE: 78 MMHG | RESPIRATION RATE: 12 BRPM | HEIGHT: 70 IN | BODY MASS INDEX: 28.55 KG/M2

## 2020-01-17 DIAGNOSIS — I51.89 DIASTOLIC DYSFUNCTION: ICD-10-CM

## 2020-01-17 DIAGNOSIS — I10 ESSENTIAL HYPERTENSION: ICD-10-CM

## 2020-01-17 DIAGNOSIS — I95.1 ORTHOSTASIS: ICD-10-CM

## 2020-01-17 DIAGNOSIS — I95.1 ORTHOSTATIC HYPOTENSION: Primary | ICD-10-CM

## 2020-01-17 RX ORDER — HYDROCHLOROTHIAZIDE 12.5 MG/1
6.25 TABLET ORAL DAILY
COMMUNITY
Start: 2020-01-17 | End: 2020-03-05 | Stop reason: SDUPTHER

## 2020-01-17 RX ORDER — TRIMETHOPRIM 100 MG/1
100 TABLET ORAL DAILY
COMMUNITY
End: 2020-03-04 | Stop reason: ALTCHOICE

## 2020-01-17 NOTE — PATIENT INSTRUCTIONS

## 2020-01-17 NOTE — PROGRESS NOTES
HPI:  Shanel Garcia is a 81y.o. year old male who is here for a follow-up visit. He continues to have episodes of orthostasis. He has been in the emergency room twice for syncopal episodes. He notes it mostly when he gets up quickly and has had complete syncope. He continues to see the urologist regularly. Denies headaches. No dizziness. No nosebleeds. No chest pains or shortness of breath. No PND or orthopnea. Past Medical History:   Diagnosis Date    Calculus of kidney >20 years    Chronic kidney disease ~ 1995    Chronic obstructive pulmonary disease ([facility] 1995    Removed 2019    CRI (chronic renal insufficiency), stage 3 (moderate) ([facility] [facility] 10/23/2019    Depression     Essential hypertension 10/23/2019    Hypertension >30 years    Kidney disease        Past Surgical History:   Procedure Laterality Date    HX CHOLECYSTECTOMY      HX COLONOSCOPY  2018    HX LITHOTRIPSY      HX PROSTATECTOMY         Prior to Admission medications    Medication Sig Start Date End Date Taking? Authorizing Provider   trimethoprim (TRIMPEX) 100 mg tablet Take 100 mg by mouth daily. Yes Provider, Historical   hydroCHLOROthiazide (HYDRODIURIL) 12.5 mg tablet Take 0.5 Tabs by mouth daily. 1/17/20  Yes Lanette Squires MD   metoprolol succinate (TOPROL-XL) 25 mg XL tablet Take 0.5 Tabs by mouth daily. 1/13/20  Yes Lanette Squires MD   venlafaxine-SR [facility] 150 mg capsule Take 1 Cap by mouth daily. 1/13/20  Yes Lanette Squires MD   traZODone (DESYREL) 100 mg tablet Take 1 Tab by mouth nightly as needed for Sleep. 1/9/20  Yes Lanette Squires MD   donepezil (ARICEPT) 10 mg tablet Take 1 Tab by mouth nightly. 1/7/20  Yes Shirley Catsro MD   finasteride (PROSCAR) 5 mg tablet TK 1 T PO  QAM 7/16/19  Yes Provider, Historical   sildenafil citrate (VIAGRA) 25 mg tablet Take 1 Tab by mouth as needed (prior to sexual activity).  5/20/19  Yes Lesley Carcamo MD   melatonin tab tablet Take 10 mg by mouth nightly. Yes Provider, Historical   multivitamin with iron tablet Take 1 Tab by mouth daily. Yes Provider, Historical   Laura Aver [id number] mg-mg-million tab Take  by mouth. Yes Provider, Historical   silodosin (RAPAFLO) 8 mg capsule Take 8 mg by mouth daily (with breakfast). 20  Provider, Historical   hydroCHLOROthiazide (HYDRODIURIL) 12.5 mg tablet Take 12.5 mg by mouth daily. 20  Provider, Historical       Social History     Socioeconomic History    Marital status:      Spouse name: Not on file    Number of children: Not on file    Years of education: Not on file    Highest education level: Not on file   Occupational History    Not on file   Social Needs    Financial resource strain: Not on file    Food insecurity:     Worry: Not on file     Inability: Not on file    Transportation needs:     Medical: Not on file     Non-medical: Not on file   Tobacco Use    Smoking status: Former Smoker     Packs/day: 1.00     Years: 45.00     Pack years: 45.00     Last attempt to quit:      Years since quittin.0    Smokeless tobacco: Never Used   Substance and Sexual Activity    Alcohol use:  Yes     Alcohol/week: 0.0 standard drinks     Types: 4 Glasses of wine per week     Drinks per session: 1 or 2     Binge frequency: Weekly    Drug use: No    Sexual activity: Never   Lifestyle    Physical activity:     Days per week: Not on file     Minutes per session: Not on file    Stress: Not on file   Relationships    Social connections:     Talks on phone: Not on file     Gets together: Not on file     Attends Adventism service: Not on file     Active member of club or organization: Not on file     Attends meetings of clubs or organizations: Not on file     Relationship status: Not on file    Intimate partner violence:     Fear of current or ex partner: Not on file     Emotionally abused: Not on file     Physically abused: Not on file     Forced sexual activity: Not on file   Other Topics Concern    Not on file   Social History Narrative    Not on file          ROS  Per HPI    Visit Vitals  /78   Pulse 76   Temp 97.4 °F (36.3 °C) (Oral)   Resp 12   Ht 5' 10\" (1.778 m)   Wt 199 lb 6.4 oz (90.4 kg)   SpO2 98%   BMI 28.61 kg/m²         Physical Exam   Physical Examination: General appearance - alert, well appearing, and in no distress  Mouth - mucous membranes moist, pharynx normal without lesions  Neck - supple, no significant adenopathy  Lymphatics - no palpable lymphadenopathy, no hepatosplenomegaly  Chest - clear to auscultation, no wheezes, rales or rhonchi, symmetric air entry  Heart - normal rate and regular rhythm  Abdomen - soft, nontender, nondistended, no masses or organomegaly  Neurological - alert, oriented, normal speech, no focal findings or movement disorder noted, motor and sensory grossly normal bilaterally  Extremities - peripheral pulses normal, no pedal edema, no clubbing or cyanosis      Assessment/Plan:  Diagnoses and all orders for this visit:    1. Orthostatic hypotension -demonstrated again on exam today. Given his diastolic dysfunction, feel it is appropriate for him to continue diuretics. We will reduce his hydrochlorothiazide to 6.25 mg a day and see if that is helpful. He will work on orthostatic precautions and work on hydration. 2. Orthostasis    3. Essential hypertension-continue to monitor his blood pressure. 4. Diastolic dysfunction-as above. We will follow-up here in a month with blood pressure readings. Follow-up and Dispositions    · Return in about 1 month (around 2/17/2020). Advised him to call back or return to office if symptoms worsen/change/persist.  Discussed expected course/resolution/complications of diagnosis in detail with patient. Medication risks/benefits/costs/interactions/alternatives discussed with patient.   He was given an after visit summary which includes diagnoses, current medications, & vitals. He expressed understanding with the diagnosis and plan.

## 2020-01-31 ENCOUNTER — PATIENT OUTREACH (OUTPATIENT)
Dept: INTERNAL MEDICINE CLINIC | Age: 85
End: 2020-01-31

## 2020-01-31 NOTE — PROGRESS NOTES
Ambulatory Care Management Note    Date/Time:  1/31/2020 4:15 PM    This patient was received as a referral from [place] [id number] outreached to patient today to offer care management services. Introduction to self and role of care manager provided. Patient accepted care management services at this time. No follow up call scheduled at this time. Patient reports he is following PCP instructions and has not had any blackouts since. Patient does get dizzy if he gets up too quickly. Encouraged patient to get up slowly to allow BP to adjust. Patient reports he is no longer taking his BP but he did just after last incident and kept log during that time. He states everything has been ok recently and has Ambulatory Care Manager's contact number for for any questions or concerns.    Luis Mary RN  Ambulatory Care Manager

## 2020-02-17 ENCOUNTER — PATIENT OUTREACH (OUTPATIENT)
Dept: INTERNAL MEDICINE CLINIC | Age: 85
End: 2020-02-17

## 2020-02-17 ENCOUNTER — OFFICE VISIT (OUTPATIENT)
Dept: INTERNAL MEDICINE CLINIC | Age: 85
End: 2020-02-17

## 2020-02-17 VITALS
HEIGHT: 70 IN | SYSTOLIC BLOOD PRESSURE: 118 MMHG | OXYGEN SATURATION: 96 % | TEMPERATURE: 98.1 F | RESPIRATION RATE: 14 BRPM | WEIGHT: 199 LBS | BODY MASS INDEX: 28.49 KG/M2 | HEART RATE: 65 BPM | DIASTOLIC BLOOD PRESSURE: 78 MMHG

## 2020-02-17 DIAGNOSIS — R35.1 BENIGN PROSTATIC HYPERPLASIA WITH NOCTURIA: ICD-10-CM

## 2020-02-17 DIAGNOSIS — F41.9 ANXIETY: ICD-10-CM

## 2020-02-17 DIAGNOSIS — N18.30 CRI (CHRONIC RENAL INSUFFICIENCY), STAGE 3 (MODERATE) (HCC): ICD-10-CM

## 2020-02-17 DIAGNOSIS — N40.1 BENIGN PROSTATIC HYPERPLASIA WITH NOCTURIA: ICD-10-CM

## 2020-02-17 DIAGNOSIS — I95.1 ORTHOSTATIC HYPOTENSION: Primary | ICD-10-CM

## 2020-02-17 DIAGNOSIS — I10 ESSENTIAL HYPERTENSION: ICD-10-CM

## 2020-02-17 RX ORDER — VENLAFAXINE 75 MG/1
75 TABLET ORAL 3 TIMES DAILY
COMMUNITY
Start: 2020-02-17 | End: 2020-03-05 | Stop reason: SDUPTHER

## 2020-02-17 NOTE — PROGRESS NOTES
HPI:  Agustina Menon is a 81y.o. year old male who is here for a follow-up visit. He is continued to have episodes of lightheadedness particularly when he stands. He has not had any falls. He does feel that he might be slightly better since we reduced his hydrochlorothiazide. He has a follow-up appointment coming up with the nephrologist in the next 2 months. He does feel that his ongoing issues with irritability. He has been on Effexor for years and thinks that it might need to be adjusted. Denies any suicidal homicidal ideation. Past Medical History:   Diagnosis Date    Calculus of kidney >20 years    Chronic kidney disease ~ 1995    Chronic obstructive pulmonary disease ([facility] [address] 1995    Removed 2019    CRI (chronic renal insufficiency), stage 3 (moderate) ([facility] [address] [facility] 10/23/2019    Depression     Essential hypertension 10/23/2019    Hypertension >30 years    Kidney disease        Past Surgical History:   Procedure Laterality Date    HX CHOLECYSTECTOMY      HX COLONOSCOPY  2018    HX LITHOTRIPSY      HX PROSTATECTOMY         Prior to Admission medications    Medication Sig Start Date End Date Taking? Authorizing Provider   venlafaxine (EFFEXOR) 75 mg tablet Take 1 Tab by mouth three (3) times daily. 2/17/20  Yes Sean Flores MD   hydroCHLOROthiazide (HYDRODIURIL) 12.5 mg tablet Take 0.5 Tabs by mouth daily. 1/17/20  Yes Sean Flores MD   metoprolol succinate (TOPROL-XL) 25 mg XL tablet Take 0.5 Tabs by mouth daily. 1/13/20  Yes Sean Flores MD   traZODone (DESYREL) 100 mg tablet Take 1 Tab by mouth nightly as needed for Sleep. 1/9/20  Yes Sean Flores MD   donepezil (ARICEPT) 10 mg tablet Take 1 Tab by mouth nightly. 1/7/20  Yes Vasile Ding MD   melatonin tab tablet Take 10 mg by mouth nightly. Yes Provider, Historical   multivitamin with iron tablet Take 1 Tab by mouth daily.    Yes Provider, Historical   Krystal Sanchez [id number] mg-mg-million tab Take  by mouth.   Yes Provider, Historical   trimethoprim (TRIMPEX) 100 mg tablet Take 100 mg by mouth daily. Provider, Historical   venlafaxine-SR (EFFEXOR-XR) 150 mg capsule Take 1 Cap by mouth daily. 20  Radha Richards III, MD   finasteride (PROSCAR) 5 mg tablet TK 1 T PO  QAM 19   Provider, Historical   sildenafil citrate (VIAGRA) 25 mg tablet Take 1 Tab by mouth as needed (prior to sexual activity). 19   Jewels Ash MD       Social History     Socioeconomic History    Marital status:      Spouse name: Not on file    Number of children: Not on file    Years of education: Not on file    Highest education level: Not on file   Occupational History    Not on file   Social Needs    Financial resource strain: Not on file    Food insecurity:     Worry: Not on file     Inability: Not on file    Transportation needs:     Medical: Not on file     Non-medical: Not on file   Tobacco Use    Smoking status: Former Smoker     Packs/day: 1.00     Years: 45.00     Pack years: 45.00     Last attempt to quit:      Years since quittin.    Smokeless tobacco: Never Used   Substance and Sexual Activity    Alcohol use:  Yes     Alcohol/week: 0.0 standard drinks     Types: 4 Glasses of wine per week     Drinks per session: 1 or 2     Binge frequency: Weekly    Drug use: No    Sexual activity: Never   Lifestyle    Physical activity:     Days per week: Not on file     Minutes per session: Not on file    Stress: Not on file   Relationships    Social connections:     Talks on phone: Not on file     Gets together: Not on file     Attends Quaker service: Not on file     Active member of club or organization: Not on file     Attends meetings of clubs or organizations: Not on file     Relationship status: Not on file    Intimate partner violence:     Fear of current or ex partner: Not on file     Emotionally abused: Not on file     Physically abused: Not on file     Forced sexual activity: Not on file   Other Topics Concern    Not on file   Social History Narrative    Not on file          ROS  Per HPI    Visit Vitals  /78   Pulse 65   Temp 98.1 °F (36.7 °C) (Oral)   Resp 14   Ht 5' 10\" (1.778 m)   Wt 199 lb (90.3 kg)   SpO2 96%   BMI 28.55 kg/m²         Physical Exam   Physical Examination: General appearance - alert, well appearing, and in no distress  Chest - clear to auscultation, no wheezes, rales or rhonchi, symmetric air entry  Heart - normal rate, regular rhythm, normal S1, S2, no murmurs, rubs, clicks or gallops  Abdomen - soft, nontender, nondistended, no masses or organomegaly  Neurological - alert, oriented, normal speech, no focal findings or movement disorder noted  Extremities - peripheral pulses normal, no pedal edema, no clubbing or cyanosis      Assessment/Plan:  Diagnoses and all orders for this visit:    1. Orthostatic hypotension-still problematic. At this point will continue to force fluids and keep his blood pressure medicines the same. Will increase his Effexor for his anxiety and see if that causes an increase in blood pressure that helps with the symptoms. 2. Benign prostatic hyperplasia with nocturia-stable on current meds. 3. CRI (chronic renal insufficiency), stage 3 (moderate) ([facility])-needs follow-up lab work and will have that done with nephrology. 4. Essential hypertension-blood pressure good today while sitting. Note he does have a significant fall with standing. 5. Anxiety-per above. Increase Effexor to 3 times daily. Follow-up here in a month to assess how he is doing and have his physical.              Advised him to call back or return to office if symptoms worsen/change/persist.  Discussed expected course/resolution/complications of diagnosis in detail with patient. Medication risks/benefits/costs/interactions/alternatives discussed with patient.   He was given an after visit summary which includes diagnoses, current medications, & vitals. He expressed understanding with the diagnosis and plan.

## 2020-02-17 NOTE — PROGRESS NOTES
Ambulatory Care Management Note      Date/Time:  2/17/2020 3:42 PM    This patient was received as a referral from [address] [place] [address] reviewed with the provider   Orthostatic hypotension. Patient reports having had 2 falls in the last year after feeling of lightheadedness. He has started changing positions slowly and has not had a fall since. Continues to complain of feeling weird at times and will discuss at today's appointment with PCP    States he would like to discuss referral to psychiatry for medication adjustment and will also discuss at today's office visit. States he has unusually short fuse lately. Ambulatory  contacted patient for discussion and case management of orthostatic hypotension   Summary of patients top problems:   1. Orthostatic hypotension. Pt has had 2 falls in last year after feeling lightheaded/dizzy. None since getting up slowly. 2. Psychiatric referral. Patient stated he needs to have medication adjusted he has been taking for years due to increased \"short fuse\" lately. Patient's challenges to self management identified:   lack of knowledge about disease, level of motivation      Medication Management:  patient reports taking medications as directed. Med Rec performed by rooming nurse at this outreach    Advance Care Planning:   Does patient have an Advance Directive:  reviewed and current    [employer], Referrals, and Durable Medical Equipment: none    PCP/Specialist follow up:   Future Appointments   Date Time Provider Charlotte Paula   3/4/2020  4:00 PM Trevor Davenport MD [id number] [facility]   4/14/2020  2:40 PM Estrella Cochran MD [address]   6/19/2020  2:40 PM Ezra Conde MD [address]          Goals      Patient/Family verbalizes understanding of self-management of chronic disease.       2/17/20202 Skills and education necessary to properly manage orthostatic hypotension  History: patient has history of episodes of lightheadedness. He has been concentrating on getting up slowly from sitting/lying position which has helped. Last ED visit was Oct 2019 for a fall from dizziness  Current level of understanding: Patient appears to have good understanding of his health  Desired Outcome: reduce episodes of lightheadedness and stabilize BP   Plan: Educate on allowing BP to adjust and changing positions slowly. Monitor BP. Encourage light exercise and hydration. Krystal Merlos RN  Ambulatory Care Manager                  Patient verbalized understanding of all information discussed. Patient has this Ambulatory Care Manager's contact information for any further questions, concerns, or needs.

## 2020-02-17 NOTE — PATIENT INSTRUCTIONS

## 2020-03-04 ENCOUNTER — PATIENT OUTREACH (OUTPATIENT)
Dept: INTERNAL MEDICINE CLINIC | Age: 85
End: 2020-03-04

## 2020-03-04 ENCOUNTER — OFFICE VISIT (OUTPATIENT)
Dept: INTERNAL MEDICINE CLINIC | Age: 85
End: 2020-03-04

## 2020-03-04 VITALS
HEIGHT: 70 IN | BODY MASS INDEX: 28.69 KG/M2 | SYSTOLIC BLOOD PRESSURE: 133 MMHG | OXYGEN SATURATION: 95 % | HEART RATE: 59 BPM | DIASTOLIC BLOOD PRESSURE: 72 MMHG | TEMPERATURE: 97.9 F | WEIGHT: 200.4 LBS | RESPIRATION RATE: 16 BRPM

## 2020-03-04 DIAGNOSIS — I10 ESSENTIAL HYPERTENSION: ICD-10-CM

## 2020-03-04 DIAGNOSIS — F51.01 PRIMARY INSOMNIA: ICD-10-CM

## 2020-03-04 DIAGNOSIS — Z00.00 MEDICARE ANNUAL WELLNESS VISIT, SUBSEQUENT: Primary | ICD-10-CM

## 2020-03-04 DIAGNOSIS — I95.1 ORTHOSTATIC HYPOTENSION: ICD-10-CM

## 2020-03-04 DIAGNOSIS — N18.30 CRI (CHRONIC RENAL INSUFFICIENCY), STAGE 3 (MODERATE) (HCC): ICD-10-CM

## 2020-03-04 DIAGNOSIS — J30.0 VASOMOTOR RHINITIS: ICD-10-CM

## 2020-03-04 DIAGNOSIS — N40.1 BENIGN PROSTATIC HYPERPLASIA WITH NOCTURIA: ICD-10-CM

## 2020-03-04 DIAGNOSIS — R35.1 BENIGN PROSTATIC HYPERPLASIA WITH NOCTURIA: ICD-10-CM

## 2020-03-04 RX ORDER — IPRATROPIUM BROMIDE 42 UG/1
2 SPRAY, METERED NASAL
Qty: 15 ML | Refills: 3 | Status: SHIPPED | OUTPATIENT
Start: 2020-03-04 | End: 2020-06-14 | Stop reason: SDUPTHER

## 2020-03-04 RX ORDER — DESONIDE 0.5 MG/G
CREAM TOPICAL 2 TIMES DAILY
Qty: 60 G | Refills: 1 | Status: SHIPPED | OUTPATIENT
Start: 2020-03-04 | End: 2021-01-04 | Stop reason: ALTCHOICE

## 2020-03-04 NOTE — PROGRESS NOTES
Goals      Patient/Family verbalizes understanding of self-management of chronic disease. 3/4/2020 Met with patient briefly in office today for introduction to self and to supply contact information. Patient reports doing well. No complaints to writer at [employer]. ACM will FU 1 week after today's PCP office visit  Summer Oquendo RN  Ambulatory Care Manager     2/17/20202 Skills and education necessary to properly manage orthostatic hypotension  History: patient has history of episodes of lightheadedness. He has been concentrating on getting up slowly from sitting/lying position which has helped. Last ED visit was Oct 2019 for a fall from dizziness  Current level of understanding: Patient appears to have good understanding of his health  Desired Outcome: reduce episodes of lightheadedness and stabilize BP   Plan: Educate on allowing BP to adjust and changing positions slowly. Monitor BP. Encourage light exercise and hydration.    Summer Oquendo RN  Ambulatory Care Manager

## 2020-03-04 NOTE — PROGRESS NOTES
This is the Subsequent Medicare Annual Wellness Exam, performed 12 months or more after the Initial AWV or the last Subsequent AWV    I have reviewed the patient's medical history in detail and updated the computerized patient record. As well as a follow-up of his health issues. He has had several problems he wanted to discuss. He has had some dry scaly skin over both ears that is controlled with using previous steroid cream.  He has chronic clear nasal discharge in postnasal drip. No sinus headaches. No dizziness. No nosebleeds. No chest pains or shortness of breath. He has had some cracking and popping in his neck when he turns it with minimal pain or discomfort. Minimal dizziness particularly when he stands quickly. History     Patient Active Problem List   Diagnosis Code    Erectile dysfunction N52.9    Irritation of external ear canal, bilateral H61.893    Primary insomnia F51.01    Essential hypertension I10    CRI (chronic renal insufficiency), stage 3 (moderate) ([facility]) N18.3    Orthostatic hypotension I95.1    Benign prostatic hyperplasia with nocturia N40.1, R35.1     Past Medical History:   Diagnosis Date    Calculus of kidney >20 years    Chronic kidney disease ~ 1995    Chronic obstructive pulmonary disease ([facility] [address] 1995    Removed 2019    CRI (chronic renal insufficiency), stage 3 (moderate) ([address] 10/23/2019    Depression     Essential hypertension 10/23/2019    Hypertension >30 years    Kidney disease       Past Surgical History:   Procedure Laterality Date    HX CHOLECYSTECTOMY      HX COLONOSCOPY  2018    HX LITHOTRIPSY      HX PROSTATECTOMY       Current Outpatient Medications   Medication Sig Dispense Refill    desonide (TRIDESILON) 0.05 % cream Apply  to affected area two (2) times a day. 60 g 1    ipratropium (ATROVENT) 42 mcg (0.06 %) nasal spray 2 Sprays by Both Nostrils route three (3) times daily as needed for Rhinitis.  15 mL 3    venlafaxine (EFFEXOR) 75 mg tablet Take 1 Tab by mouth three (3) times daily.  hydroCHLOROthiazide (HYDRODIURIL) 12.5 mg tablet Take 0.5 Tabs by mouth daily.  metoprolol succinate (TOPROL-XL) 25 mg XL tablet Take 0.5 Tabs by mouth daily. 45 Tab 1    traZODone (DESYREL) 100 mg tablet Take 1 Tab by mouth nightly as needed for Sleep. 90 Tab 1    donepezil (ARICEPT) 10 mg tablet Take 1 Tab by mouth nightly. 90 Tab 2    melatonin tab tablet Take 10 mg by mouth nightly.  multivitamin with iron tablet Take 1 Tab by mouth daily.  AZO CRANBERRY 855-52-28 mg-mg-million tab Take  by mouth.  sildenafil citrate (VIAGRA) 25 mg tablet Take 1 Tab by mouth as needed (prior to sexual activity). 27 Tab 1     No Known Allergies    Family History   Problem Relation Age of Onset    Hypertension Mother         cerebral hemmorage     Stroke Mother     Prostate Cancer Father     Cancer Father         Prostate Cancer    Suicide Brother     Suicide Daughter      Social History     Tobacco Use    Smoking status: Former Smoker     Packs/day: 1.00     Years: 45.00     Pack years: 45.00     Last attempt to quit:      Years since quittin.    Smokeless tobacco: Never Used   Substance Use Topics    Alcohol use:  Yes     Alcohol/week: 0.0 standard drinks     Types: 4 Glasses of wine per week     Drinks per session: 1 or 2     Binge frequency: Weekly     Comment: weekly   ROS - Per HPI  Physical Examination: General appearance - alert, well appearing, and in no distress  Nose - normal and patent, no erythema, discharge or polyps  Mouth - mucous membranes moist, pharynx normal without lesions  Neck - supple, no significant adenopathy  Lymphatics - no palpable lymphadenopathy, no hepatosplenomegaly  Chest - clear to auscultation, no wheezes, rales or rhonchi, symmetric air entry  Heart - normal rate, regular rhythm, normal S1, S2, no murmurs, rubs, clicks or gallops  Abdomen - soft, nontender, nondistended, no masses or organomegaly  Neurological - alert, oriented, normal speech, no focal findings or movement disorder noted  Musculoskeletal - no joint tenderness, deformity or swelling  Extremities - peripheral pulses normal, no pedal edema, no clubbing or cyanosis      Depression Risk Factor Screening:     3 most recent PHQ Screens 1/17/2020   Little interest or pleasure in doing things Not at all   Feeling down, depressed, irritable, or hopeless Not at all   Total Score PHQ 2 0   Trouble falling or staying asleep, or sleeping too much -   Feeling tired or having little energy -   Poor appetite, weight loss, or overeating -   Feeling bad about yourself - or that you are a failure or have let yourself or your family down -   Trouble concentrating on things such as school, work, reading, or watching TV -   Moving or speaking so slowly that other people could have noticed; or the opposite being so fidgety that others notice -   Thoughts of being better off dead, or hurting yourself in some way -   PHQ 9 Score -   How difficult have these problems made it for you to do your work, take care of your home and get along with others -       Alcohol Risk Factor Screening (MALE > 65): Do you average more 1 drink per night or more than 7 drinks a week: No    In the past three months have you have had more than 4 drinks containing alcohol on one occasion: No      Functional Ability and Level of Safety:   Hearing: Hearing is good. Activities of Daily Living: The home contains: no safety equipment. Patient does total self care    Ambulation: with no difficulty    Fall Risk:  Fall Risk Assessment, last 12 mths 2/17/2020   Able to walk? Yes   Fall in past 12 months? Yes   Fall with injury?  No   Number of falls in past 12 months 2   Fall Risk Score 2       Abuse Screen:  Patient is not abused    Cognitive Screening   Has your family/caregiver stated any concerns about your memory: no      Patient Care Team   Patient Care Team:  Luther Santiago Eliu Ashraf MD as PCP - General (Internal Medicine)  Lory Moya MD as P[facility]  Orestes Cervantes RN as [facility] (Internal Medicine)    Assessment/Plan   Education and counseling provided:  Are appropriate based on today's review and evaluation  End-of-Life planning (with patient's consent)    Diagnoses and all orders for this visit:    1. Benign prostatic hyperplasia with nocturia -stable and followed by urology. 2. Primary insomnia -continue trazodone. 3. CRI (chronic renal insufficiency), stage 3 (moderate) ([facility]) -we will have lab work done with nephrology. He is due in the next couple months. 4. Orthostatic hypotension -reasonably controlled on current meds. Continue to work on hydration and avoiding standing quickly. 5. Essential hypertension -blood pressure well controlled. 6. Medicare annual wellness visit, subsequent    7. Nonallergic rhinitiswe will try Atrovent nasal spray. 8.  Eczematopical steroids as needed. 9.  Likely degenerative change in the neck stretching exercises and will see Ortho if needed. Other orders  -     desonide (TRIDESILON) 0.05 % cream; Apply  to affected area two (2) times a day. -     ipratropium (ATROVENT) 42 mcg (0.06 %) nasal spray; 2 Sprays by Both Nostrils route three (3) times daily as needed for Rhinitis.         Health Maintenance Due   Topic Date Due    GLAUCOMA SCREENING Q2Y  05/25/2001    Pneumococcal 65+ years (2 of 2 - PPSV23) 02/20/2020    Medicare Yearly Exam  02/21/2020

## 2020-03-04 NOTE — PATIENT INSTRUCTIONS
Neck Arthritis: Exercises Introduction Here are some examples of exercises for you to try. The exercises may be suggested for a condition or for rehabilitation. Start each exercise slowly. Ease off the exercises if you start to have pain. You will be told when to start these exercises and which ones will work best for you. How to do the exercises Neck stretches to the side 1. This stretch works best if you keep your shoulder down as you lean away from it. To help you remember to do this, start by relaxing your shoulders and lightly holding on to your thighs or your chair. 2. Tilt your head toward your shoulder and hold for 15 to 30 seconds. Let the weight of your head stretch your muscles. 3. Repeat 2 to 4 times toward each shoulder. Chin tuck 1. Lie on the floor with a rolled-up towel under your neck. Your head should be touching the floor. 2. Slowly bring your chin toward your chest. 
3. Hold for a count of 6, and then relax for up to 10 seconds. 4. Repeat 8 to 12 times. Active cervical rotation 1. Sit in a firm chair, or stand up straight. 2. Keeping your chin level, turn your head to the right, and hold for 15 to 30 seconds. 3. Turn your head to the left and hold for 15 to 30 seconds. 4. Repeat 2 to 4 times to each side. Shoulder blade squeeze 1. While standing, squeeze your shoulder blades together. 2. Do not raise your shoulders up as you are squeezing. 3. Hold for 6 seconds. 4. Repeat 8 to 12 times. Shoulder rolls 1. Sit comfortably with your feet shoulder-width apart. You can also do this exercise standing up. 2. Roll your shoulders up, then back, and then down in a smooth, circular motion. 3. Repeat 2 to 4 times. Follow-up care is a key part of your treatment and safety. Be sure to make and go to all appointments, and call your doctor if you are having problems. It's also a good idea to know your test results and keep a list of the medicines you take. Where can you learn more? Go to http://mey-mone.info/. Enter E177 in the search box to learn more about \"Neck Arthritis: Exercises. \" Current as of: June 26, 2019 Content Version: 12.2 © 0956-6190 [employer]. Care instructions adapted under license by [employer] (which disclaims liability or warranty for this information). If you have questions about a medical condition or this instruction, always ask your healthcare professional. [address] any warranty or liability for your use of this information. Medicare Wellness Visit, Male The best way to live healthy is to have a lifestyle where you eat a well-balanced diet, exercise regularly, limit alcohol use, and quit all forms of tobacco/nicotine, if applicable. Regular preventive services are another way to keep healthy. Preventive services (vaccines, screening tests, monitoring & exams) can help personalize your care plan, which helps you manage your own care. Screening tests can find health problems at the earliest stages, when they are easiest to treat. [facility] follows the current, evidence-based guidelines published by the [place] [employer] (USPSTF) when recommending preventive services for our patients. Because we follow these guidelines, sometimes recommendations change over time as research supports it. (For example, a prostate screening blood test is no longer routinely recommended for men with no symptoms). Of course, you and your doctor may decide to screen more often for some diseases, based on your risk and co-morbidities (chronic disease you are already diagnosed with). Preventive services for you include: - Medicare offers their members a free annual wellness visit, which is time for you and your primary care provider to discuss and plan for your preventive service needs. Take advantage of this benefit every year! 
-All adults over age 72 should receive the recommended pneumonia vaccines. Current USPSTF guidelines recommend a series of two vaccines for the best pneumonia protection.  
-All adults should have a flu vaccine yearly and tetanus vaccine every 10 years. 
-All adults age 48 and older should receive the shingles vaccines (series of two vaccines). -All adults age 38-68 who are overweight should have a diabetes screening test once every three years.  
-Other screening tests & preventive services for persons with diabetes include: an eye exam to screen for diabetic retinopathy, a kidney function test, a foot exam, and stricter control over your cholesterol.  
-Cardiovascular screening for adults with routine risk involves an electrocardiogram (ECG) at intervals determined by the provider.  
-Colorectal cancer screening should be done for adults age 54-65 with no increased risk factors for colorectal cancer. There are a number of acceptable methods of screening for this type of cancer. Each test has its own benefits and drawbacks. Discuss with your provider what is most appropriate for you during your annual wellness visit. The different tests include: colonoscopy (considered the best screening method), a fecal occult blood test, a fecal DNA test, and sigmoidoscopy. 
-All adults born between [facility] should be screened once for Hepatitis C. 
-An Abdominal Aortic Aneurysm (AAA) Screening is recommended for men age 73-68 who has ever smoked in their lifetime. Here is a list of your current Health Maintenance items (your personalized list of preventive services) with a due date: 
Health Maintenance Due Topic Date Due  Glaucoma Screening   05/25/2001  Pneumococcal Vaccine (2 of 2 - PPSV23) 02/20/2020 Jane[facility] Annual Well Visit  02/21/2020

## 2020-03-04 NOTE — PROGRESS NOTES
Chief Complaint   Patient presents with    Complete Physical         1. Have you been to the ER, urgent care clinic since your last visit? Hospitalized since your last visit? no    2. Have you seen or consulted any other health care providers outside of the [address] [place] [address] since your last visit? Include any pap smears or colon screening.   no

## 2020-03-05 DIAGNOSIS — Z86.79 HISTORY OF ATRIAL FIBRILLATION: ICD-10-CM

## 2020-03-05 RX ORDER — METOPROLOL SUCCINATE 25 MG/1
12.5 TABLET, EXTENDED RELEASE ORAL DAILY
Qty: 45 TAB | Refills: 1 | Status: CANCELLED | OUTPATIENT
Start: 2020-03-05

## 2020-03-05 RX ORDER — TRAZODONE HYDROCHLORIDE 100 MG/1
100 TABLET ORAL
Qty: 90 TAB | Refills: 1 | Status: CANCELLED | OUTPATIENT
Start: 2020-03-05

## 2020-03-05 RX ORDER — VENLAFAXINE 75 MG/1
75 TABLET ORAL 3 TIMES DAILY
Qty: 270 TAB | Refills: 1 | Status: SHIPPED | COMMUNITY
Start: 2020-03-05 | End: 2020-04-24 | Stop reason: DRUGHIGH

## 2020-03-05 RX ORDER — HYDROCHLOROTHIAZIDE 12.5 MG/1
6.25 TABLET ORAL DAILY
Qty: 45 TAB | Refills: 1 | Status: SHIPPED | COMMUNITY
Start: 2020-03-05 | End: 2020-11-16 | Stop reason: ALTCHOICE

## 2020-04-02 RX ORDER — TRAZODONE HYDROCHLORIDE 100 MG/1
TABLET ORAL
Qty: 90 TAB | Refills: 1 | Status: SHIPPED | OUTPATIENT
Start: 2020-04-02 | End: 2020-04-24 | Stop reason: DRUGHIGH

## 2020-04-07 ENCOUNTER — PATIENT OUTREACH (OUTPATIENT)
Dept: INTERNAL MEDICINE CLINIC | Age: 85
End: 2020-04-07

## 2020-04-07 NOTE — PROGRESS NOTES
Goals      Patient/Family verbalizes understanding of self-management of chronic disease. 4/7/2020  Patient reports:  - feeling ok. No new problems at this time  - ACM notified patient of new COVID-19 workflow and hope to resolve CCM in the near future. - BS and Dept. of Health hotline numbers for COVID-19 supplied along with ACM new contact number. BS 24/7 john with coupon code supplied for free virtual visit should patient experience symptoms of COVID-19.   - ACM will resume CCM when pandemic ends. Dory Davenport RN  Ambulatory Care Manager       3/4/2020 Met with patient briefly in office today for introduction to self and to supply contact information. Patient reports doing well. No complaints to writer at [employer]. ACM will FU 1 week after today's PCP office visit  Dory Davenport RN  Ambulatory Care Manager     2/17/20202 Skills and education necessary to properly manage orthostatic hypotension  History: patient has history of episodes of lightheadedness. He has been concentrating on getting up slowly from sitting/lying position which has helped. Last ED visit was Oct 2019 for a fall from dizziness  Current level of understanding: Patient appears to have good understanding of his health  Desired Outcome: reduce episodes of lightheadedness and stabilize BP   Plan: Educate on allowing BP to adjust and changing positions slowly. Monitor BP. Encourage light exercise and hydration.    Dory Davenport RN  Ambulatory Care Manager

## 2020-04-14 ENCOUNTER — VIRTUAL VISIT (OUTPATIENT)
Dept: NEUROLOGY | Age: 85
End: 2020-04-14

## 2020-04-14 VITALS — BODY MASS INDEX: 27.92 KG/M2 | WEIGHT: 195 LBS | HEIGHT: 70 IN

## 2020-04-14 DIAGNOSIS — G31.84 MILD COGNITIVE IMPAIRMENT: Primary | ICD-10-CM

## 2020-04-14 NOTE — PATIENT INSTRUCTIONS
PRESCRIPTION REFILL POLICY [facility] Statement to Patients April 1, 2014 In an effort to ensure the large volume of patient prescription refills is processed in the most efficient and expeditious manner, we are asking our patients to assist us by calling your Pharmacy for all prescription refills, this will include also your  Mail Order Pharmacy. The pharmacy will contact our office electronically to continue the refill process. Please do not wait until the last minute to call your pharmacy. We need at least 48 hours (2days) to fill prescriptions. We also encourage you to call your pharmacy before going to  your prescription to make sure it is ready. With regard to controlled substance prescription refill requests (narcotic refills) that need to be picked up at our office, we ask your cooperation by providing us with at least 72 hours (3days) notice that you will need a refill. We will not refill narcotic prescription refill requests after 4:00pm on any weekday, Monday through Thursday, or after 2:00pm on Fridays, or on the weekends. We encourage everyone to explore another way of getting your prescription refill request processed using [employer], our patient web portal through our electronic medical record system. [employer] is an efficient and effective way to communicate your medication request directly to the office and  downloadable as an john on your smart phone . [employer] also features a review functionality that allows you to view your medication list as well as leave messages for your physician. Are you ready to get connected? If so please review the attatched instructions or speak to any of our staff to get you set up right away! Thank you so much for your cooperation. Should you have any questions please contact our Practice Administrator. The Physicians and Staff,  [facility]

## 2020-04-14 NOTE — PROGRESS NOTES
Chief Complaint   Patient presents with    Memory Loss   This is a telemedicine visit that was performed with the originating site at [facility] and the distant site at patient's home. Verbal consent to participate in video visit was obtained. The patient was identified by name and date of birth. This visit occurred during the Coronavirus [phone]) [facility] Emergency. I discussed with the patient the nature of our telemedicine visits, that:     I would evaluate the patient and recommend diagnostics and treatments based on my assessment   Our sessions are not being recorded and that personal health information is protected   Our team would provide follow up care in person if/when the patient needs it      Blanca Kevin Eric who was evaluated over a video call today. He was at home with his significant other. He has now moved into live with her. His overall functional abilities have not changed. Short-term memory continues to be a problem but denies any significant decline. Stays indoors these days and tries to do some crossword puzzles etc.  He does go out for a walk or sometimes will go for a drive drive. Donepezil seems to have helped him a lot  Remains independent with all activities of daily living    RECAP  For the past couple years, he has been having difficulties with short-term memory. He started noticing it more ever since his wife passed away last year. He says that he will get up to go do something and then all of a sudden he will forget what he was about to do. He has also been having difficulty finding words during conversations and tends to forget simple things. He remains independent with all activities of daily living. He now has a new girlfriend who is also the same age as him. She has not expressed any concern about his cognitive functioning.    He drives, denies ever getting lost.  Mila Cavazostos his money and finances without problem. Lives alone and pays his bills on time. Current Outpatient Medications   Medication Sig    traZODone (DESYREL) 100 mg tablet TAKE 1 TABLET NIGHTLY AS   NEEDED FOR SLEEP (Patient taking differently: Indications: taking 150mg)    venlafaxine (EFFEXOR) 75 mg tablet Take 1 Tab by mouth three (3) times daily. (Patient taking differently: Take 75 mg by mouth two (2) times a day.)    hydroCHLOROthiazide (HYDRODIURIL) 12.5 mg tablet Take 0.5 Tabs by mouth daily.  desonide (TRIDESILON) 0.05 % cream Apply  to affected area two (2) times a day.  ipratropium (ATROVENT) 42 mcg (0.06 %) nasal spray 2 Sprays by Both Nostrils route three (3) times daily as needed for Rhinitis.  metoprolol succinate (TOPROL-XL) 25 mg XL tablet Take 0.5 Tabs by mouth daily.  donepezil (ARICEPT) 10 mg tablet Take 1 Tab by mouth nightly.  melatonin tab tablet Take 10 mg by mouth nightly.  multivitamin with iron tablet Take 1 Tab by mouth daily.  AZO CRANBERRY 321-63-20 mg-mg-million tab Take  by mouth.  sildenafil citrate (VIAGRA) 25 mg tablet Take 1 Tab by mouth as needed (prior to sexual activity). No current facility-administered medications for this visit. PHYSICAL EXAMINATION:    Visit Vitals  Ht 5' 10\" (1.778 m)   Wt 88.5 kg (195 lb)   BMI 27.98 kg/m²     Due to this being a TeleHealth evaluation, many elements of the physical examination are unable to be assessed. Exam:  NEUROLOGICAL EXAM:  General: Awake, alert, oriented x 3. He was able to spell the word house forwards and backwards. Short-term recall was 0/3 after 2 minutes. His last [place] score was 24/30. CN: EOMI, facial strength normal and symmetric, hearing is intact  Motor: AG x 4  Reflexes: deferred  Coordination: No ataxia.   Sensation: LT intact throughout  Gait: Steady      LABS / IMAGING  Lab Results   Component Value Date/Time    WBC 9.6 10/20/2019 11:08 AM    HGB 16.2 10/20/2019 11:08 AM    HCT 48.4 10/20/2019 11:08 AM PLATELET 224 (L) 06/47/6017 11:08 AM    MCV 90.0 10/20/2019 11:08 AM     Lab Results   Component Value Date/Time    Sodium 139 10/20/2019 11:08 AM    Potassium 3.7 10/20/2019 11:08 AM    Chloride 103 10/20/2019 11:08 AM    CO2 31 10/20/2019 11:08 AM    Anion gap 5 10/20/2019 11:08 AM    Glucose 94 10/20/2019 11:08 AM    BUN 47 (H) 10/20/2019 11:08 AM    Creatinine 1.97 (H) 10/20/2019 11:08 AM    BUN/Creatinine ratio 24 (H) 10/20/2019 11:08 AM    GFR est AA 40 (L) 10/20/2019 11:08 AM    GFR est non-AA 33 (L) 10/20/2019 11:08 AM    Calcium 9.9 10/20/2019 11:08 AM    Bilirubin, total 0.6 10/20/2019 11:08 AM    AST (SGOT) 16 10/20/2019 11:08 AM    Alk. phosphatase 57 10/20/2019 11:08 AM    Protein, total 7.2 10/20/2019 11:08 AM    Albumin 3.6 10/20/2019 11:08 AM    Globulin 3.6 10/20/2019 11:08 AM    A-G Ratio 1.0 (L) 10/20/2019 11:08 AM    ALT (SGPT) 30 10/20/2019 11:08 AM     Lab Results   Component Value Date/Time    TSH 1.210 10/18/2018 08:13 AM     Lab Results   Component Value Date/Time    Vitamin B12 1,175 03/28/2019 02:09 PM     CT Results (most recent):  Results from Hospital Encounter encounter on 10/20/19   CT HEAD WO CONT    Narrative EXAM: CT HEAD WO CONT    INDICATION: dizzy    COMPARISON: CT 4/4/2019. CONTRAST: None. TECHNIQUE: Unenhanced CT of the head was performed using 5 mm images. Brain and  bone windows were generated. CT dose reduction was achieved through use of a  standardized protocol tailored for this examination and automatic exposure  control for dose modulation. FINDINGS:  Mild prominence the ventricles and cortical sulci consistent with atrophy is  again shown. Mild bilateral periventricular diminished attenuation in the  cerebrum is unchanged, consistent with chronic small vessel ischemic disease the  white matter. There is no intracranial hemorrhage, extra-axial collection, mass,  mass effect or midline shift. The basilar cisterns are open. No acute infarct  is identified. The bone windows demonstrate no abnormalities. The visualized  portions of the paranasal sinuses and mastoid air cells are clear. Impression IMPRESSION: No acute findings. ASSESSMENT    ICD-10-CM ICD-9-CM    1. Mild cognitive impairment G31.84 331.83        DISCUSSION  Mr. Kota Blancas has has mild cognitive impairment with significant difficulty with short-term recall but does well otherwise on cognitive screening. He has remained stable in the last 6 months   Continue donepezil 10 mg daily   He is independent with activities of daily living and is currently living with his significant other  Follow-up in 4 months, hopefully we will be able to do well in person visit    Samantha Navarrete MD  Diplomate, [address] [place] [address] Rd., [address] of Psychiatry & Neurology (Neurology)  Diplomate, American Board of Psychiatry & Neurology (Clinical Neurophysiology)  Diplomate, American Board of Electrodiagnostic Medicine    This note will not be viewable in [address]

## 2020-05-28 ENCOUNTER — TELEPHONE (OUTPATIENT)
Dept: NEUROLOGY | Age: 85
End: 2020-05-28

## 2020-06-15 RX ORDER — IPRATROPIUM BROMIDE 42 UG/1
2 SPRAY, METERED NASAL
Qty: 15 ML | Refills: 3 | Status: SHIPPED | OUTPATIENT
Start: 2020-06-15 | End: 2021-03-26 | Stop reason: ALTCHOICE

## 2020-06-16 ENCOUNTER — VIRTUAL VISIT (OUTPATIENT)
Dept: NEUROLOGY | Age: 85
End: 2020-06-16

## 2020-06-16 DIAGNOSIS — G31.84 MILD COGNITIVE IMPAIRMENT: Primary | ICD-10-CM

## 2020-06-16 NOTE — PROGRESS NOTES
Chief Complaint   Patient presents with    Memory Loss   This is a telemedicine visit that was performed with the originating site at [address] [place] [address] and the distant site at patient's home. Verbal consent to participate in video visit was obtained. The patient was identified by name and date of birth. This visit occurred during the Coronavirus [phone]) [facility] Emergency. I discussed with the patient the nature of our telemedicine visits, that:     I would evaluate the patient and recommend diagnostics and treatments based on my assessment   Our sessions are not being recorded and that personal health information is protected   Our team would provide follow up care in person if/when the patient needs it      [facility]  Dung Suazo who was evaluated over a video call today. He states that he has remained stable, no significant changes. No issues or concerns to report  Short-term memory continues to be a problem but denies any significant decline. Stays indoors these days and tries to do some crossword puzzles etc.  He does go out for a walk or sometimes will go for a drive drive. Donepezil seems to have helped him a lot  He sees a psychiatrist and was recently started on BuSpar in addition to venlafaxine that he takes  Remains independent with all activities of daily living    RECAP  For the past couple years, he has been having difficulties with short-term memory. He started noticing it more ever since his wife passed away last year. He says that he will get up to go do something and then all of a sudden he will forget what he was about to do. He has also been having difficulty finding words during conversations and tends to forget simple things. He remains independent with all activities of daily living. He now has a new girlfriend who is also the same age as him. She has not expressed any concern about his cognitive functioning.    He drives, denies ever getting lost.  Handles his money and finances without problem. Lives alone and pays his bills on time. Current Outpatient Medications   Medication Sig    ipratropium (ATROVENT) 42 mcg (0.06 %) nasal spray 2 Sprays by Both Nostrils route three (3) times daily as needed for Rhinitis.  venlafaxine (EFFEXOR) 75 mg tablet Take 25 mg by mouth two (2) times a day.  traZODone (DESYREL) 150 mg tablet Take 150 mg by mouth nightly.  desonide (TRIDESILON) 0.05 % cream Apply  to affected area two (2) times a day.  metoprolol succinate (TOPROL-XL) 25 mg XL tablet Take 0.5 Tabs by mouth daily.  donepezil (ARICEPT) 10 mg tablet Take 1 Tab by mouth nightly.  melatonin tab tablet Take 10 mg by mouth nightly.  multivitamin with iron tablet Take 1 Tab by mouth daily.  hydroCHLOROthiazide (HYDRODIURIL) 12.5 mg tablet Take 0.5 Tabs by mouth daily.  sildenafil citrate (VIAGRA) 25 mg tablet Take 1 Tab by mouth as needed (prior to sexual activity).  AZO CRANBERRY 190-97-12 mg-mg-million tab Take  by mouth. No current facility-administered medications for this visit. PHYSICAL EXAMINATION:    There were no vitals taken for this visit. Due to this being a TeleHealth evaluation, many elements of the physical examination are unable to be assessed. Exam:  NEUROLOGICAL EXAM:  General: Awake, alert, oriented x 3. He was able to spell the word house forwards and backwards. Short-term recall was 0/3 after 2 minutes. His last [place] score was 24/30. CN: EOMI, facial strength normal and symmetric, hearing is intact  Motor: AG x 4  Reflexes: deferred  Coordination: No ataxia.   Sensation: LT intact throughout  Gait: Steady      LABS / IMAGING  Lab Results   Component Value Date/Time    WBC 9.6 10/20/2019 11:08 AM    HGB 16.2 10/20/2019 11:08 AM    HCT 48.4 10/20/2019 11:08 AM    PLATELET 838 (L) 92/05/0841 11:08 AM    MCV 90.0 10/20/2019 11:08 AM     Lab Results   Component Value Date/Time    Sodium 139 10/20/2019 11:08 AM    Potassium 3.7 10/20/2019 11:08 AM    Chloride 103 10/20/2019 11:08 AM    CO2 31 10/20/2019 11:08 AM    Anion gap 5 10/20/2019 11:08 AM    Glucose 94 10/20/2019 11:08 AM    BUN 47 (H) 10/20/2019 11:08 AM    Creatinine 1.97 (H) 10/20/2019 11:08 AM    BUN/Creatinine ratio 24 (H) 10/20/2019 11:08 AM    GFR est AA 40 (L) 10/20/2019 11:08 AM    GFR est non-AA 33 (L) 10/20/2019 11:08 AM    Calcium 9.9 10/20/2019 11:08 AM    Bilirubin, total 0.6 10/20/2019 11:08 AM    Alk. phosphatase 57 10/20/2019 11:08 AM    Protein, total 7.2 10/20/2019 11:08 AM    Albumin 3.6 10/20/2019 11:08 AM    Globulin 3.6 10/20/2019 11:08 AM    A-G Ratio 1.0 (L) 10/20/2019 11:08 AM    ALT (SGPT) 30 10/20/2019 11:08 AM     Lab Results   Component Value Date/Time    TSH 1.210 10/18/2018 08:13 AM     Lab Results   Component Value Date/Time    Vitamin B12 1,175 03/28/2019 02:09 PM     CT Results (most recent):  Results from Hospital Encounter encounter on 10/20/19   CT HEAD WO CONT    Narrative EXAM: CT HEAD WO CONT    INDICATION: dizzy    COMPARISON: CT 4/4/2019. CONTRAST: None. TECHNIQUE: Unenhanced CT of the head was performed using 5 mm images. Brain and  bone windows were generated. CT dose reduction was achieved through use of a  standardized protocol tailored for this examination and automatic exposure  control for dose modulation. FINDINGS:  Mild prominence the ventricles and cortical sulci consistent with atrophy is  again shown. Mild bilateral periventricular diminished attenuation in the  cerebrum is unchanged, consistent with chronic small vessel ischemic disease the  white matter. There is no intracranial hemorrhage, extra-axial collection, mass,  mass effect or midline shift. The basilar cisterns are open. No acute infarct  is identified. The bone windows demonstrate no abnormalities. The visualized  portions of the paranasal sinuses and mastoid air cells are clear.       Impression IMPRESSION: No acute findings. ASSESSMENT    ICD-10-CM ICD-9-CM    1. Mild cognitive impairment G31.84 331.83        DISCUSSION  Mr. Kota Blancas has has mild cognitive impairment with significant difficulty with short-term recall but does well otherwise on cognitive screening. He has remained stable in the last 6-8 months   Continue donepezil 10 mg daily   He is independent with activities of daily living and is currently living with his significant other  Follow-up in 6 months, hopefully in person    Samantha Navarrete MD  Diplomate, [address] [place] [address] Rd., [address] of Psychiatry & Neurology (Neurology)  Diplomate, American Board of Psychiatry & Neurology (Clinical Neurophysiology)  Diplomate, American Board of Electrodiagnostic Medicine    This note will not be viewable in [address]

## 2020-07-15 ENCOUNTER — TELEPHONE (OUTPATIENT)
Dept: NEUROLOGY | Age: 85
End: 2020-07-15

## 2020-07-15 NOTE — TELEPHONE ENCOUNTER
Pt called wanting to schedule appt with Dr Conrad Collier informed him that Dr Conrad Collier was booked out a ways but I would be happy to take a al ook at our NP's schedule, he kept interrupting me saying  listen, lady obed. Have Dr Conrad Collier call me, I said sir Dr Conrad Collier is on call in the hospital and I can have his nurse give you a call and he just kept screaming LADY SALOMON LISTEN TO ME DAMMIT. If you don't listen to me you will regret it, I will come over there and beat the crap out of you. I said sir I am hanging up and disconnected the phone. He called back and was transferred to [place] the manager.

## 2020-07-15 NOTE — TELEPHONE ENCOUNTER
Ericka Klinefelter, Practice Administrator    Patient called and I tried to help him but he kept yelling and being very ugly on the phone.

## 2020-07-21 ENCOUNTER — OFFICE VISIT (OUTPATIENT)
Dept: NEUROLOGY | Age: 85
End: 2020-07-21

## 2020-07-21 VITALS
HEIGHT: 70 IN | RESPIRATION RATE: 18 BRPM | OXYGEN SATURATION: 97 % | BODY MASS INDEX: 27.92 KG/M2 | HEART RATE: 76 BPM | WEIGHT: 195 LBS | DIASTOLIC BLOOD PRESSURE: 78 MMHG | SYSTOLIC BLOOD PRESSURE: 140 MMHG

## 2020-07-21 DIAGNOSIS — G31.84 MILD COGNITIVE IMPAIRMENT: Primary | ICD-10-CM

## 2020-07-21 RX ORDER — MICONAZOLE NITRATE 2 %
CREAM WITH APPLICATOR VAGINAL 2 TIMES DAILY
COMMUNITY

## 2020-07-21 RX ORDER — BUSPIRONE HYDROCHLORIDE 5 MG/1
TABLET ORAL 2 TIMES DAILY
COMMUNITY
End: 2021-04-05 | Stop reason: DRUGHIGH

## 2020-07-21 NOTE — PATIENT INSTRUCTIONS
[facility]   Statement to Patients  April 1, 2014      In an effort to ensure the large volume of patient prescription refills is processed in the most efficient and expeditious manner, we are asking our patients to assist us by calling your Pharmacy for all prescription refills, this will include also your  Mail Order Pharmacy. The pharmacy will contact our office electronically to continue the refill process. Please do not wait until the last minute to call your pharmacy. We need at least 48 hours (2days) to fill prescriptions. We also encourage you to call your pharmacy before going to  your prescription to make sure it is ready. With regard to controlled substance prescription refill requests (narcotic refills) that need to be picked up at our office, we ask your cooperation by providing us with at least 72 hours (3days) notice that you will need a refill. We will not refill narcotic prescription refill requests after 4:00pm on any weekday, Monday through Thursday, or after 2:00pm on Fridays, or on the weekends. We encourage everyone to explore another way of getting your prescription refill request processed using [employer], our patient web portal through our electronic medical record system. [employer] is an efficient and effective way to communicate your medication request directly to the office and  downloadable as an john on your smart phone . [employer] also features a review functionality that allows you to view your medication list as well as leave messages for your physician. Are you ready to get connected? If so please review the attatched instructions or speak to any of our staff to get you set up right away! Thank you so much for your cooperation. Should you have any questions please contact our Practice Administrator.     The Physicians and Staff,  [facility]

## 2020-07-21 NOTE — PROGRESS NOTES
Mr. Brenda Easton presents today to follow up cognitive impairment. Depression screening done on patient.

## 2020-07-21 NOTE — PROGRESS NOTES
Chief Complaint   Patient presents with    Memory Loss       HISTORY OF PRESENT ILLNESS  Ewa Call came back for follow-up today. He seemed somewhat angry and frustrated today. He reports an incident recently when he went to [employer]. He apparently walked into the store prior to being waved in by the security person. He was advised not to enter but he got angry/frustrated and started using language that he did not intend to. Police was called and he was arrested. The case is in the court and he is talking with a . He does report that getting angry and frustrated easily but denies any suicidal ideation. His memory has been worse over the last 2 to 3 weeks. Overall there is no significant change to his living situation or his ability to do his daily tasks. Short-term memory continues to be a problem. Living with his significant other. Takes medications regularly  Has an appointment coming up with his psychiatrist tomorrow. Currently on BuSpar and venlafaxine. RECAP  For the past couple years, he has been having difficulties with short-term memory. He started noticing it more ever since his wife passed away last year. He says that he will get up to go do something and then all of a sudden he will forget what he was about to do. He has also been having difficulty finding words during conversations and tends to forget simple things. He remains independent with all activities of daily living. He now has a new girlfriend who is also the same age as him. She has not expressed any concern about his cognitive functioning. He drives, denies ever getting lost.  Joe Deter his money and finances without problem. Lives alone and pays his bills on time. Current Outpatient Medications   Medication Sig    busPIRone (BUSPAR) 5 mg tablet Take  by mouth two (2) times a day.  apixaban (Eliquis) 2.5 mg tablet Take 2.5 mg by mouth two (2) times a day.     calcium citrate-vitamin D3 (CITRACAL WITH VITAMIN D MAXIMUM) tablet Take  by mouth two (2) times a day.  ipratropium (ATROVENT) 42 mcg (0.06 %) nasal spray 2 Sprays by Both Nostrils route three (3) times daily as needed for Rhinitis.  venlafaxine (EFFEXOR) 75 mg tablet Take 25 mg by mouth two (2) times a day.  traZODone (DESYREL) 150 mg tablet Take 150 mg by mouth nightly.  metoprolol succinate (TOPROL-XL) 25 mg XL tablet Take 0.5 Tabs by mouth daily.  donepezil (ARICEPT) 10 mg tablet Take 1 Tab by mouth nightly.  melatonin tab tablet Take 10 mg by mouth nightly.  multivitamin with iron tablet Take 1 Tab by mouth daily.  hydroCHLOROthiazide (HYDRODIURIL) 12.5 mg tablet Take 0.5 Tabs by mouth daily.  desonide (TRIDESILON) 0.05 % cream Apply  to affected area two (2) times a day.  sildenafil citrate (VIAGRA) 25 mg tablet Take 1 Tab by mouth as needed (prior to sexual activity).  AZO CRANBERRY 915-20-55 mg-mg-million tab Take  by mouth. No current facility-administered medications for this visit. PHYSICAL EXAMINATION:    Visit Vitals  /78   Pulse 76   Resp 18   Ht 5' 10\" (1.778 m)   Wt 88.5 kg (195 lb)   SpO2 97%   BMI 27.98 kg/m²     Exam:  NEUROLOGICAL EXAM:  General: Awake, alert, oriented x 3. His Royal cognitive assessment score was 24/30. His main difficulty seems to reach impaired short-term recall that was 0 out of 5. CN: EOMI, facial strength normal and symmetric, hearing is intact  Motor: AG x 4  Reflexes: deferred  Coordination: No ataxia.   Sensation: LT intact throughout  Gait: Steady      LABS / IMAGING  Lab Results   Component Value Date/Time    WBC 9.6 10/20/2019 11:08 AM    HGB 16.2 10/20/2019 11:08 AM    HCT 48.4 10/20/2019 11:08 AM    PLATELET 015 (L) 58/49/0848 11:08 AM    MCV 90.0 10/20/2019 11:08 AM     Lab Results   Component Value Date/Time    Sodium 139 10/20/2019 11:08 AM    Potassium 3.7 10/20/2019 11:08 AM    Chloride 103 10/20/2019 11:08 AM    CO2 31 10/20/2019 11:08 AM    Anion gap 5 10/20/2019 11:08 AM    Glucose 94 10/20/2019 11:08 AM    BUN 47 (H) 10/20/2019 11:08 AM    Creatinine 1.97 (H) 10/20/2019 11:08 AM    BUN/Creatinine ratio 24 (H) 10/20/2019 11:08 AM    GFR est AA 40 (L) 10/20/2019 11:08 AM    GFR est non-AA 33 (L) 10/20/2019 11:08 AM    Calcium 9.9 10/20/2019 11:08 AM    Bilirubin, total 0.6 10/20/2019 11:08 AM    Alk. phosphatase 57 10/20/2019 11:08 AM    Protein, total 7.2 10/20/2019 11:08 AM    Albumin 3.6 10/20/2019 11:08 AM    Globulin 3.6 10/20/2019 11:08 AM    A-G Ratio 1.0 (L) 10/20/2019 11:08 AM    ALT (SGPT) 30 10/20/2019 11:08 AM     Lab Results   Component Value Date/Time    TSH 1.210 10/18/2018 08:13 AM     Lab Results   Component Value Date/Time    Vitamin B12 1,175 03/28/2019 02:09 PM     CT Results (most recent):  Results from Hospital Encounter encounter on 10/20/19   CT HEAD WO CONT    Narrative EXAM: CT HEAD WO CONT    INDICATION: dizzy    COMPARISON: CT 4/4/2019. CONTRAST: None. TECHNIQUE: Unenhanced CT of the head was performed using 5 mm images. Brain and  bone windows were generated. CT dose reduction was achieved through use of a  standardized protocol tailored for this examination and automatic exposure  control for dose modulation. FINDINGS:  Mild prominence the ventricles and cortical sulci consistent with atrophy is  again shown. Mild bilateral periventricular diminished attenuation in the  cerebrum is unchanged, consistent with chronic small vessel ischemic disease the  white matter. There is no intracranial hemorrhage, extra-axial collection, mass,  mass effect or midline shift. The basilar cisterns are open. No acute infarct  is identified. The bone windows demonstrate no abnormalities. The visualized  portions of the paranasal sinuses and mastoid air cells are clear. Impression IMPRESSION: No acute findings.            ASSESSMENT    ICD-10-CM ICD-9-CM    1. Mild cognitive impairment  G31.84 331.83 DISCUSSION  Mr. Ryan Bourne has has mild cognitive impairment with significant difficulty with short-term recall but does well otherwise on cognitive screening. He has remained stable in this regard over the past year  He reports recent decompensation in his ability to remember and do things which could be due to fluctuations in mood and anxiety levels  He has an appointment coming up with his psychiatrist tomorrow for further optimization of his medications  Continue donepezil 10 mg daily   He is independent with activities of daily living and is currently living with his significant other  Follow-up in 6 months    I spent greater than 25 minutes with the patient and more than 50% of the time was spent in counseling and coordination of care. Gume Cooper MD  Diplomate, American Board of Psychiatry & Neurology (Neurology)  [place] Board of Psychiatry & Neurology (Clinical Neurophysiology)  Diplomate, American Board of Electrodiagnostic Medicine  This note will not be viewable in [address]

## 2020-07-24 ENCOUNTER — TELEPHONE (OUTPATIENT)
Dept: NEUROLOGY | Age: 85
End: 2020-07-24

## 2020-07-24 ENCOUNTER — PATIENT OUTREACH (OUTPATIENT)
Dept: CASE MANAGEMENT | Age: 85
End: 2020-07-24

## 2020-07-24 DIAGNOSIS — G31.84 MILD COGNITIVE IMPAIRMENT: ICD-10-CM

## 2020-07-24 RX ORDER — DONEPEZIL HYDROCHLORIDE 10 MG/1
10 TABLET, FILM COATED ORAL
Qty: 90 TAB | Refills: 2 | Status: SHIPPED | OUTPATIENT
Start: 2020-07-24 | End: 2020-10-15 | Stop reason: SDUPTHER

## 2020-07-24 NOTE — TELEPHONE ENCOUNTER
Message from Virginia Ramirez & ADVIZE dea caller, medication was sent to mail order company not [facility] [address] [place] [address].\"    Another message \"pls cl: ran out of medication. Is concerned about side effects of not having the Donepezil 10mg.  \"

## 2020-07-24 NOTE — PROGRESS NOTES
Patient has graduated from the Complex Case Management  program on 20. Patient/family has the ability to self-manage at this time Care management goals have been completed. No further Ambulatory Care Manager follow up scheduled. Goals Addressed                 This Visit's Progress     COMPLETED: Patient/Family verbalizes understanding of self-management of chronic disease. 20 Patient reports:  - doing well for now. No complaints. - spoke with patient. ID verified name and . - ACM discussed current workflow continues to be focused on COVID19 with no expected date of resolution and return to Complex Case Management. - ACM offered patient choice of ACM continuing to check in periodically with patient or resolve episode of care. Patient reports no need for CCM at this time. - ACM will resolve episode of care. Tiffanie De Leon RN  Ambulatory Care Manager      2020  Patient reports:  - feeling ok. No new problems at this time  - ACM notified patient of new COVID-19 workflow and hope to resolve CCM in the near future. -  and Dept. of Health hotline numbers for COVID-19 supplied along with ACM new contact number.   john with coupon code supplied for free virtual visit should patient experience symptoms of COVID-19.   - ACM will resume CCM when pandemic ends. Tiffanie De Leon RN  Ambulatory Care Manager       3/4/2020 Met with patient briefly in office today for introduction to self and to supply contact information. Patient reports doing well. No complaints to writer at [place]. ACM will FU 1 week after today's PCP office visit  Tiffanie De Leon RN  Ambulatory Care Manager      Skills and education necessary to properly manage orthostatic hypotension  History: patient has history of episodes of lightheadedness. He has been concentrating on getting up slowly from sitting/lying position which has helped.  Last ED visit was Oct 2019 for a fall from dizziness  Current level of understanding: Patient appears to have good understanding of his health  Desired Outcome: reduce episodes of lightheadedness and stabilize BP   Plan: Educate on allowing BP to adjust and changing positions slowly. Monitor BP. Encourage light exercise and hydration. Broadus Sandhoff, RN  Ambulatory Care Manager                 Patient has Ambulatory Care Manager's contact information for any further questions, concerns, or needs. Patients upcoming visits:  No future appointments.

## 2020-10-15 DIAGNOSIS — G31.84 MILD COGNITIVE IMPAIRMENT: ICD-10-CM

## 2020-10-15 RX ORDER — DONEPEZIL HYDROCHLORIDE 10 MG/1
10 TABLET, FILM COATED ORAL
Qty: 90 TAB | Refills: 2 | Status: SHIPPED | OUTPATIENT
Start: 2020-10-15 | End: 2021-06-04 | Stop reason: SDUPTHER

## 2020-10-15 NOTE — TELEPHONE ENCOUNTER
Requested Prescriptions     Pending Prescriptions Disp Refills    donepeziL (ARICEPT) 10 mg tablet 90 Tab 2     Sig: Take 1 Tab by mouth nightly.

## 2020-11-16 ENCOUNTER — OFFICE VISIT (OUTPATIENT)
Dept: INTERNAL MEDICINE CLINIC | Age: 85
End: 2020-11-16
Payer: MEDICARE

## 2020-11-16 VITALS
SYSTOLIC BLOOD PRESSURE: 140 MMHG | WEIGHT: 201 LBS | TEMPERATURE: 98.1 F | BODY MASS INDEX: 28.77 KG/M2 | HEIGHT: 70 IN | DIASTOLIC BLOOD PRESSURE: 90 MMHG | RESPIRATION RATE: 10 BRPM | HEART RATE: 74 BPM | OXYGEN SATURATION: 96 %

## 2020-11-16 DIAGNOSIS — M47.812 CERVICAL SPINE ARTHRITIS: Primary | ICD-10-CM

## 2020-11-16 PROCEDURE — G0463 HOSPITAL OUTPT CLINIC VISIT: HCPCS | Performed by: INTERNAL MEDICINE

## 2020-11-16 PROCEDURE — G8432 DEP SCR NOT DOC, RNG: HCPCS | Performed by: INTERNAL MEDICINE

## 2020-11-16 PROCEDURE — G8753 SYS BP > OR = 140: HCPCS | Performed by: INTERNAL MEDICINE

## 2020-11-16 PROCEDURE — 99214 OFFICE O/P EST MOD 30 MIN: CPT | Performed by: INTERNAL MEDICINE

## 2020-11-16 PROCEDURE — G8755 DIAS BP > OR = 90: HCPCS | Performed by: INTERNAL MEDICINE

## 2020-11-16 PROCEDURE — 1101F PT FALLS ASSESS-DOCD LE1/YR: CPT | Performed by: INTERNAL MEDICINE

## 2020-11-16 PROCEDURE — G8419 CALC BMI OUT NRM PARAM NOF/U: HCPCS | Performed by: INTERNAL MEDICINE

## 2020-11-16 PROCEDURE — G8427 DOCREV CUR MEDS BY ELIG CLIN: HCPCS | Performed by: INTERNAL MEDICINE

## 2020-11-16 PROCEDURE — G8536 NO DOC ELDER MAL SCRN: HCPCS | Performed by: INTERNAL MEDICINE

## 2020-11-16 NOTE — PROGRESS NOTES
HPI:  Abraham Pruett is a 81y.o. year old male who is here for a routine visit:    Presents for a follow-up visit. He has noted some intermittent cracking and popping in his neck. He has minimal discomfort there. No pain that radiates to the arms. No numbness, tingling, or weakness. So wanted to discuss a new cardiologist as he is disappointed in the size of [place] [facility] and feels that he is not getting personal care there. He also wanted to discuss memory issues. He feels that his memory continues to decline despite taking Aricept. He did recently see neurology for follow-up. He wondered about taking Prevagen      Past Medical History:   Diagnosis Date    Calculus of kidney >20 years    Chronic kidney disease ~ 19    Chronic obstructive pulmonary disease ([facility] [address] 1995    Removed 2019    CRI (chronic renal insufficiency), stage 3 (moderate) ([facility] [address] 10/23/2019    Depression     Essential hypertension 10/23/2019    Hypertension >30 years    Kidney disease        Past Surgical History:   Procedure Laterality Date    HX CHOLECYSTECTOMY      HX COLONOSCOPY  2018    HX LITHOTRIPSY      HX PROSTATECTOMY         Prior to Admission medications    Medication Sig Start Date End Date Taking? Authorizing Provider   donepeziL (ARICEPT) 10 mg tablet Take 1 Tab by mouth nightly. 10/15/20  Yes Kem Mckeon MD   busPIRone (BUSPAR) 5 mg tablet Take  by mouth two (2) times a day. Yes Provider, Historical   apixaban (Eliquis) 2.5 mg tablet Take 2.5 mg by mouth two (2) times a day. Yes Provider, Historical   calcium citrate-vitamin D3 (CITRACAL WITH VITAMIN D MAXIMUM) tablet Take  by mouth two (2) times a day. Yes Provider, Historical   venlafaxine (EFFEXOR) 75 mg tablet Take 25 mg by mouth two (2) times a day. Yes Provider, Historical   traZODone (DESYREL) 150 mg tablet Take 150 mg by mouth nightly.    Yes Provider, Historical   metoprolol succinate (TOPROL-XL) 25 mg XL tablet Take 0.5 Tabs by mouth daily. 20  Yes Samuel Santillan MD   melatonin tab tablet Take 10 mg by mouth nightly. Yes Provider, Historical   multivitamin with iron tablet Take 1 Tab by mouth daily. Yes Provider, Historical   Sienna Fonseca [id number] mg-mg-million tab Take  by mouth. Yes Provider, Historical   ipratropium (ATROVENT) 42 mcg (0.06 %) nasal spray 2 Sprays by Both Nostrils route three (3) times daily as needed for Rhinitis. 6/15/20   Sandhya Riojas III, MD   hydroCHLOROthiazide (HYDRODIURIL) 12.5 mg tablet Take 0.5 Tabs by mouth daily. 3/5/20 11/16/20  Sandhya Riojas III, MD   desonide (TRIDESILON) 0.05 % cream Apply  to affected area two (2) times a day. 3/4/20   Sandhya Riojas III, MD   sildenafil citrate (VIAGRA) 25 mg tablet Take 1 Tab by mouth as needed (prior to sexual activity). 19   Ender Tyler MD       Social History     Socioeconomic History    Marital status:      Spouse name: Not on file    Number of children: Not on file    Years of education: Not on file    Highest education level: Not on file   Occupational History    Not on file   Social Needs    Financial resource strain: Not on file    Food insecurity     Worry: Not on file     Inability: Not on file    Transportation needs     Medical: Not on file     Non-medical: Not on file   Tobacco Use    Smoking status: Former Smoker     Packs/day: 1.00     Years: 45.00     Pack years: 45.00     Last attempt to quit:      Years since quittin.8    Smokeless tobacco: Never Used   Substance and Sexual Activity    Alcohol use:  Yes     Alcohol/week: 0.0 standard drinks     Types: 4 Glasses of wine per week     Drinks per session: 1 or 2     Binge frequency: Weekly     Comment: weekly    Drug use: No    Sexual activity: Never   Lifestyle    Physical activity     Days per week: Not on file     Minutes per session: Not on file    Stress: Not on file   Relationships    Social connections     Talks on phone: Not on file     Gets together: Not on file     Attends [employer] service: Not on file     Active member of club or organization: Not on file     Attends meetings of clubs or organizations: Not on file     Relationship status: Not on file    Intimate partner violence     Fear of current or ex partner: Not on file     Emotionally abused: Not on file     Physically abused: Not on file     Forced sexual activity: Not on file   Other Topics Concern    Not on file   Social History Narrative    Not on file          ROS  Per HPI    Visit Vitals  BP (!) 140/90   Pulse 74   Temp 98.1 °F (36.7 °C) (Temporal)   Resp 10   Ht 5' 10\" (1.778 m)   Wt 201 lb (91.2 kg)   SpO2 96%   BMI 28.84 kg/m²         Physical Exam   Physical Examination: General appearance - alert, well appearing, and in no distress  Neck - supple, no significant adenopathy  Chest - clear to auscultation, no wheezes, rales or rhonchi, symmetric air entry  Heart - normal rate and regular rhythm  Neurological - motor and sensory grossly normal bilaterally, normal ROM in the neck and normal strength in the arms. Negative Spurling's maneuver. Assessment/Plan:  Diagnoses and all orders for this visit:    1. Cervical spine arthritiswe will treat with stretching exercises. If not improving, consider physical therapy. 2.  Memory lossdiscussed the addition of Namenda and he was given written information to consider it. He will think about whether he wants to do that. 3.  History of orthostasisresolved with stopping his diuretic. I gave him the name of Mitzy Stapleton for another cardiologist and he will consider that. Advised him to call back or return to office if symptoms worsen/change/persist.  Discussed expected course/resolution/complications of diagnosis in detail with patient. Medication risks/benefits/costs/interactions/alternatives discussed with patient.   He was given an after visit summary which includes diagnoses, current medications, & vitals. He expressed understanding with the diagnosis and plan.

## 2020-11-16 NOTE — PATIENT INSTRUCTIONS
Memantine (By mouth) Memantine (me-MAN-teen) Treats dementia associated with Alzheimer disease. Brand Name(s): Namenda, Namenda Titration Pack, Namenda XR, Namenda XR Titration Pack There may be other brand names for this medicine. When This Medicine Should Not Be Used: You should not use this medicine if you have had an allergic reaction to memantine. How to Use This Medicine:  
Long Acting Capsule, Liquid, Tablet · Take your medicine as directed. Your dose may need to be changed several times to find what works best for you. Most people need to wait at least one week between dose changes. · You may take this medicine with or without food. · Measure the oral liquid medicine with a marked measuring spoon, oral syringe, or medicine cup. · Swallow the extended-release capsule whole. Do not crush, break, or chew it. · If you cannot swallow the extended-release capsule, you may open it and pour the medicine into a small amount of soft food such as pudding, yogurt, or applesauce. Stir this mixture well and swallow it without chewing. · For the liquid and extended-release capsule form: Read and follow the patient instructions that come with this medicine. Talk to your doctor or pharmacist if you have any questions. If a dose is missed: · Take a dose as soon as you remember. If it is almost time for your next dose, wait until then and take a regular dose. Do not take extra medicine to make up for a missed dose. How to Store and Dispose of This Medicine: · Store the medicine in a closed container at room temperature, away from heat, moisture, and direct light. · Ask your pharmacist, doctor, or health caregiver about the best way to dispose of any outdated medicine or medicine no longer needed. · Keep all medicine out of the reach of children. Never share your medicine with anyone. Drugs and Foods to Avoid: Ask your doctor or pharmacist before using any other medicine, including over-the-counter medicines, vitamins, and herbal products. · Make sure your doctor knows if you are also using amantadine (Symmetrel®), cimetidine (Tagamet®), ketamine (Sindy Games), metformin (Glucophage®), quinidine (Cardioquin®, Miller Darter), or ranitidine (Zantac®). · Tell your doctor if you are also using a diuretic or \"water pill\" (such as acetazolamide, hydrochlorothiazide [HCTZ], methazolamide, triamterene, Diamox®, Dyazide®, Dyrenium®, Maxzide®, or Neptazane®) or an antacid or laxative that contains sodium bicarbonate, baking soda, or bicarbonate of soda (such as Andra-Yakima®). · Tell your doctor if you smoke or if you are using a stop-smoking aid that contains nicotine (such as Nicoderm®, Nicorette®, or Nicotrol®) or a cold or cough medicine that contains dextromethorphan (DayQuil®, NyQuil®, Robitussin® DM, or TheraFlu®). Warnings While Using This Medicine: · Make sure your doctor knows if you are pregnant or breastfeeding, or if you have kidney disease, liver disease, bladder problems or difficulty with urination, or epilepsy or seizures. · Check with your doctor right away if you get a urinary tract infection. This includes any infection in your bladder or kidneys. Your dose of this medicine might need to be changed while you have an infection. · Your doctor will check your progress and the effects of this medicine at regular visits. Keep all appointments. Possible Side Effects While Using This Medicine:  
Call your doctor right away if you notice any of these side effects: · Allergic reaction: Itching or hives, swelling in your face or hands, swelling or tingling in your mouth or throat, chest tightness, trouble breathing · Change in how much or how often you urinate. · Chest pain. · Lightheadedness, dizziness, or fainting. · Seeing or hearing things that are not there. · Severe sleepiness, restlessness, or confusion. · Sudden or severe headache. If you notice these less serious side effects, talk with your doctor: · Back pain. · Constipation, diarrhea, vomiting, or stomach pain. · Feeling aggressive or depressed. · Mild headache. · Tiredness or weakness. · Weight gain. If you notice other side effects that you think are caused by this medicine, tell your doctor. Call your doctor for medical advice about side effects. You may report side effects to FDA at 3-784-RLW-0826 © 2017 [facility] Information is for End User's use only and may not be sold, redistributed or otherwise used for commercial purposes. The above information is an  only. It is not intended as medical advice for individual conditions or treatments. Talk to your doctor, nurse or pharmacist before following any medical regimen to see if it is safe and effective for you. Neck Strain or Sprain: Rehab Exercises Introduction Here are some examples of exercises for you to try. The exercises may be suggested for a condition or for rehabilitation. Start each exercise slowly. Ease off the exercises if you start to have pain. You will be told when to start these exercises and which ones will work best for you. How to do the exercises Neck rotation 1. Sit in a firm chair, or stand up straight. 2. Keeping your chin level, turn your head to the right, and hold for 15 to 30 seconds. 3. Turn your head to the left and hold for 15 to 30 seconds. 4. Repeat 2 to 4 times to each side. Neck stretches 1. Look straight ahead, and tip your right ear to your right shoulder. Do not let your left shoulder rise up as you tip your head to the right. 2. Hold for 15 to 30 seconds. 3. Tilt your head to the left. Do not let your right shoulder rise up as you tip your head to the left. 4. Hold for 15 to 30 seconds. 5. Repeat 2 to 4 times to each side. Forward neck flexion 1. Sit in a firm chair, or stand up straight. 2. Bend your head forward. 3. Hold for 15 to 30 seconds. 4. Repeat 2 to 4 times. Lateral (side) bend strengthening 1. With your right hand, place your first two fingers on your right temple. 2. Start to bend your head to the side while using gentle pressure from your fingers to keep your head from bending. 3. Hold for about 6 seconds. 4. Repeat 8 to 12 times. 5. Switch hands and repeat the same exercise on your left side. Forward bend strengthening 1. Place your first two fingers of either hand on your forehead. 2. Start to bend your head forward while using gentle pressure from your fingers to keep your head from bending. 3. Hold for about 6 seconds. 4. Repeat 8 to 12 times. Neutral position strengthening 1. Using one hand, place your fingertips on the back of your head at the top of your neck. 2. Start to bend your head backward while using gentle pressure from your fingers to keep your head from bending. 3. Hold for about 6 seconds. 4. Repeat 8 to 12 times. Chin tuck 1. Lie on the floor with a rolled-up towel under your neck. Your head should be touching the floor. 2. Slowly bring your chin toward your chest. 
3. Hold for a count of 6, and then relax for up to 10 seconds. 4. Repeat 8 to 12 times. Follow-up care is a key part of your treatment and safety. Be sure to make and go to all appointments, and call your doctor if you are having problems. It's also a good idea to know your test results and keep a list of the medicines you take. Where can you learn more? Go to http://www.gray.com/ Enter M679 in the search box to learn more about \"Neck Strain or Sprain: Rehab Exercises. \" Current as of: March 2, 2020               Content Version: 12.6 © 2904-0382 [employer]. Care instructions adapted under license by [employer] (which disclaims liability or warranty for this information).  If you have questions about a medical condition or this instruction, always ask your healthcare professional. [place] [address] any warranty or liability for your use of this information.

## 2020-11-19 RX ORDER — MEMANTINE HYDROCHLORIDE 10 MG/1
10 TABLET ORAL 2 TIMES DAILY
Qty: 60 TAB | Refills: 3 | Status: SHIPPED | OUTPATIENT
Start: 2020-11-19 | End: 2021-02-12

## 2020-11-27 RX ORDER — TRAZODONE HYDROCHLORIDE 100 MG/1
TABLET ORAL
Qty: 90 TAB | Refills: 2 | Status: SHIPPED | OUTPATIENT
Start: 2020-11-27 | End: 2021-01-04 | Stop reason: SDUPTHER

## 2021-01-01 ENCOUNTER — HOSPITAL ENCOUNTER (EMERGENCY)
Age: 86
Discharge: LEFT BEFORE DISCHARGE | End: 2021-01-01
Attending: EMERGENCY MEDICINE | Admitting: EMERGENCY MEDICINE
Payer: MEDICARE

## 2021-01-01 VITALS
DIASTOLIC BLOOD PRESSURE: 85 MMHG | SYSTOLIC BLOOD PRESSURE: 178 MMHG | TEMPERATURE: 96.2 F | OXYGEN SATURATION: 97 % | BODY MASS INDEX: 28.63 KG/M2 | WEIGHT: 200 LBS | HEIGHT: 70 IN | RESPIRATION RATE: 18 BRPM | HEART RATE: 92 BPM

## 2021-01-01 DIAGNOSIS — R07.9 CHEST PAIN, UNSPECIFIED TYPE: Primary | ICD-10-CM

## 2021-01-01 LAB
ALBUMIN SERPL-MCNC: 3.6 G/DL (ref 3.5–5)
ALBUMIN/GLOB SERPL: 1 {RATIO} (ref 1.1–2.2)
ALP SERPL-CCNC: 68 U/L (ref 45–117)
ALT SERPL-CCNC: 33 U/L (ref 12–78)
ANION GAP SERPL CALC-SCNC: 3 MMOL/L (ref 5–15)
AST SERPL-CCNC: 26 U/L (ref 15–37)
BASOPHILS # BLD: 0 K/UL (ref 0–0.1)
BASOPHILS NFR BLD: 0 % (ref 0–1)
BILIRUB SERPL-MCNC: 0.4 MG/DL (ref 0.2–1)
BUN SERPL-MCNC: 45 MG/DL (ref 6–20)
BUN/CREAT SERPL: 22 (ref 12–20)
CALCIUM SERPL-MCNC: 9.6 MG/DL (ref 8.5–10.1)
CHLORIDE SERPL-SCNC: 108 MMOL/L (ref 97–108)
CO2 SERPL-SCNC: 30 MMOL/L (ref 21–32)
COMMENT, HOLDF: NORMAL
CREAT SERPL-MCNC: 2.04 MG/DL (ref 0.7–1.3)
DIFFERENTIAL METHOD BLD: ABNORMAL
EOSINOPHIL # BLD: 0.2 K/UL (ref 0–0.4)
EOSINOPHIL NFR BLD: 2 % (ref 0–7)
ERYTHROCYTE [DISTWIDTH] IN BLOOD BY AUTOMATED COUNT: 13.3 % (ref 11.5–14.5)
GLOBULIN SER CALC-MCNC: 3.7 G/DL (ref 2–4)
GLUCOSE SERPL-MCNC: 133 MG/DL (ref 65–100)
HCT VFR BLD AUTO: 47.8 % (ref 36.6–50.3)
HGB BLD-MCNC: 15.5 G/DL (ref 12.1–17)
IMM GRANULOCYTES # BLD AUTO: 0 K/UL (ref 0–0.04)
IMM GRANULOCYTES NFR BLD AUTO: 0 % (ref 0–0.5)
LYMPHOCYTES # BLD: 1.9 K/UL (ref 0.8–3.5)
LYMPHOCYTES NFR BLD: 25 % (ref 12–49)
MCH RBC QN AUTO: 29 PG (ref 26–34)
MCHC RBC AUTO-ENTMCNC: 32.4 G/DL (ref 30–36.5)
MCV RBC AUTO: 89.5 FL (ref 80–99)
MONOCYTES # BLD: 0.7 K/UL (ref 0–1)
MONOCYTES NFR BLD: 9 % (ref 5–13)
NEUTS SEG # BLD: 5.1 K/UL (ref 1.8–8)
NEUTS SEG NFR BLD: 64 % (ref 32–75)
NRBC # BLD: 0 K/UL (ref 0–0.01)
NRBC BLD-RTO: 0 PER 100 WBC
PLATELET # BLD AUTO: 126 K/UL (ref 150–400)
PMV BLD AUTO: 10.5 FL (ref 8.9–12.9)
POTASSIUM SERPL-SCNC: 4.2 MMOL/L (ref 3.5–5.1)
PROT SERPL-MCNC: 7.3 G/DL (ref 6.4–8.2)
RBC # BLD AUTO: 5.34 M/UL (ref 4.1–5.7)
SAMPLES BEING HELD,HOLD: NORMAL
SODIUM SERPL-SCNC: 141 MMOL/L (ref 136–145)
TROPONIN I SERPL-MCNC: <0.05 NG/ML
WBC # BLD AUTO: 7.9 K/UL (ref 4.1–11.1)

## 2021-01-01 PROCEDURE — 93005 ELECTROCARDIOGRAM TRACING: CPT

## 2021-01-01 PROCEDURE — 99283 EMERGENCY DEPT VISIT LOW MDM: CPT

## 2021-01-01 PROCEDURE — 80053 COMPREHEN METABOLIC PANEL: CPT

## 2021-01-01 PROCEDURE — 36415 COLL VENOUS BLD VENIPUNCTURE: CPT

## 2021-01-01 PROCEDURE — 85025 COMPLETE CBC W/AUTO DIFF WBC: CPT

## 2021-01-01 PROCEDURE — 84484 ASSAY OF TROPONIN QUANT: CPT

## 2021-01-01 NOTE — ED TRIAGE NOTES
Triage:  Pt to the ED due to concerns over HTN after checking his BP at home. Pt states earlier in the evening experienced chest and abd pain and that's when he checked his BP.

## 2021-01-02 NOTE — ED NOTES
Pt wants to leave AMA and form completed and signed. Pt ambulatory from department, pt condition stable.

## 2021-01-02 NOTE — ED PROVIDER NOTES
Date of Service:  1/1/2021    Patient:  Liseth White    Chief Complaint:  Chest Pain and Abdominal Pain       HPI:  Liseth White is a 81 y.o.  male who presents for evaluation of bilateral chest pain/abdominal pain this afternoon around 4pm, was 173/128. Pain was sharp, lasted about an hour and a half, had resolved by 5:30. Pain was constant, No aggravating factors, no alleviating factors. Not related to exertion. No dizziness or lightheadedness, headaches, vision changes, nausea, vomiting, diarrhea, sob, cough, palpitations. Patient states chest pain and abdominal pain have resolved completed, he has not current complaints. ECHO Oct 2019    · Left Ventricle: Normal cavity size and systolic function (ejection fraction normal). Mild to moderate concentric hypertrophy. Calculated left ventricular ejection fraction is 59%. Biplane method used to measure ejection fraction. No regional wall motion abnormality noted. Mild (grade 1) left ventricular diastolic dysfunction. · Left Atrium: Mildly dilated left atrium. · Right Ventricle: Not well visualized. · Mitral Valve: Mild mitral annular calcification. Trace mitral valve regurgitation is present.   · Premature atrial contractions noted during exam               Past Medical History:   Diagnosis Date    Calculus of kidney >20 years    Chronic kidney disease ~ 1995    Chronic obstructive pulmonary disease ([facility] [address] 1995    Removed 2019    CRI (chronic renal insufficiency), stage 3 (moderate) 10/23/2019    Depression     Essential hypertension 10/23/2019    Hypertension >30 years    Kidney disease        Past Surgical History:   Procedure Laterality Date    HX CHOLECYSTECTOMY      HX COLONOSCOPY  2018    HX LITHOTRIPSY      HX PROSTATECTOMY           Family History:   Problem Relation Age of Onset    Hypertension Mother         cerebral hemmorage     Stroke Mother     Prostate Cancer Father     Cancer Father         Prostate Cancer    Suicide Brother     Suicide Daughter        Social History     Socioeconomic History    Marital status:      Spouse name: Not on file    Number of children: Not on file    Years of education: Not on file    Highest education level: Not on file   Occupational History    Not on file   Social Needs    Financial resource strain: Not on file    Food insecurity     Worry: Not on file     Inability: Not on file   [employer] needs     Medical: Not on file     Non-medical: Not on file   Tobacco Use    Smoking status: Former Smoker     Packs/day: 1.00     Years: 45.00     Pack years: 45.00     Quit date:      Years since quittin.0    Smokeless tobacco: Never Used   Substance and Sexual Activity    Alcohol use: Yes     Alcohol/week: 0.0 standard drinks     Types: 4 Glasses of wine per week     Drinks per session: 1 or 2     Binge frequency: Weekly     Comment: weekly    Drug use: No    Sexual activity: Never   Lifestyle    Physical activity     Days per week: Not on file     Minutes per session: Not on file    Stress: Not on file   Relationships    Social connections     Talks on phone: Not on file     Gets together: Not on file     Attends [employer] service: Not on file     Active member of club or organization: Not on file     Attends meetings of clubs or organizations: Not on file     Relationship status: Not on file    Intimate partner violence     Fear of current or ex partner: Not on file     Emotionally abused: Not on file     Physically abused: Not on file     Forced sexual activity: Not on file   Other Topics Concern    Not on file   Social History Narrative    Not on file         ALLERGIES: Patient has no known allergies. Review of Systems   Constitutional: Negative for chills and fever. HENT: Negative for trouble swallowing. Eyes: Negative for redness. Respiratory: Negative for shortness of breath. Cardiovascular: Positive for chest pain.    Gastrointestinal: Positive for abdominal pain. Negative for diarrhea, nausea and vomiting. Genitourinary: Negative for dysuria. Musculoskeletal: Negative for gait problem. Skin: Negative for rash. Neurological: Negative for syncope. Vitals:    01/01/21 1741   BP: (!) 178/85   Pulse: 92   Resp: 18   Temp: (!) 96.2 °F (35.7 °C)   SpO2: 97%   Weight: 90.7 kg (200 lb)   Height: 5' 10\" (1.778 m)            Physical Exam  Vitals signs and nursing note reviewed. Constitutional:       Appearance: Normal appearance. HENT:      Head: Normocephalic and atraumatic. Nose: Nose normal.   Eyes:      Extraocular Movements: Extraocular movements intact. Conjunctiva/sclera: Conjunctivae normal.      Pupils: Pupils are equal, round, and reactive to light. Neck:      Musculoskeletal: Normal range of motion and neck supple. Cardiovascular:      Rate and Rhythm: Normal rate and regular rhythm. Pulses: Normal pulses. Radial pulses are 2+ on the right side and 2+ on the left side. Posterior tibial pulses are 2+ on the right side and 2+ on the left side. Heart sounds: Normal heart sounds. Pulmonary:      Effort: Pulmonary effort is normal.      Breath sounds: Normal breath sounds. Abdominal:      General: Abdomen is flat. Bowel sounds are normal.      Palpations: Abdomen is soft. Musculoskeletal: Normal range of motion. Skin:     General: Skin is warm and dry. Neurological:      General: No focal deficit present. Mental Status: He is alert and oriented to person, place, and time. Mental status is at baseline. Psychiatric:         Mood and Affect: Mood normal.         Behavior: Behavior normal.         Thought Content:  Thought content normal.          MDM  Number of Diagnoses or Management Options  Chest pain, unspecified type  Diagnosis management comments: LABS:  Recent Results (from the past 6 hour(s))  -EKG, 12 LEAD, INITIAL  Collection Time: 01/01/21  5:48 PM       Result Value             Ref Range           Ventricular Rate            105               BPM                 Atrial Rate                 288               BPM                 QRS Duration                90                ms                  Q-T Interval                366               ms                  QTC Calculation (Bezet)     483               ms                  Calculated R Axis           -80               degrees             Calculated T Axis           63                degrees             Diagnosis                                                     Atrial flutter with variable AV block with premature ventricular or  aberrantly conducted complexes Left axis deviation When compared with ECG of 20-OCT-2019 11:04, Atrial flutter has replaced Sinus rhythm Criteria for Inferior infarct are no longer present ST elevation now present in Inferior leads   -METABOLIC PANEL, COMPREHENSIVE  Collection Time: 01/01/21  5:53 PM       Result                      Value             Ref Range           Sodium                      141               136 - 145 mm*       Potassium                   4.2               3.5 - 5.1 mm*       Chloride                    108               97 - 108 mmo*       CO2                         30                21 - 32 mmol*       Anion gap                   3 (L)             5 - 15 mmol/L       Glucose                     133 (H)           65 - 100 mg/*       BUN                         45 (H)            6 - 20 MG/DL        Creatinine                  2. 04 (H)          0.70 - 1.30 *       BUN/Creatinine ratio        22 (H)            12 - 20             GFR est AA                  38 (L)            >60 ml/min/1*       GFR est non-AA              31 (L)            >60 ml/min/1*       Calcium                     9.6               8.5 - 10.1 M*       Bilirubin, total            0.4               0.2 - 1.0 MG*       ALT (SGPT)                  33                12 - 78 U/L         AST (SGOT) 26                15 - 37 U/L         Alk. phosphatase            68                45 - 117 U/L        Protein, total              7.3               6.4 - 8.2 g/*       Albumin                     3.6               3.5 - 5.0 g/*       Globulin                    3.7               2.0 - 4.0 g/*       A-G Ratio                   1.0 (L)           1.1 - 2.2      -CBC WITH AUTOMATED DIFF  Collection Time: 01/01/21  5:53 PM       Result                      Value             Ref Range           WBC                         7.9               4.1 - 11.1 K*       RBC                         5.34              4.10 - 5.70 *       HGB                         15.5              12.1 - 17.0 *       HCT                         47.8              36.6 - 50.3 %       MCV                         89.5              80.0 - 99.0 *       MCH                         29.0              26.0 - 34.0 *       MCHC                        32.4              30.0 - 36.5 *       RDW                         13.3              11.5 - 14.5 %       PLATELET                    126 (L)           150 - 400 K/*       MPV                         10.5              8.9 - 12.9 FL       NRBC                        0.0               0  WBC       ABSOLUTE NRBC               0.00              0.00 - 0.01 *       NEUTROPHILS                 64                32 - 75 %           LYMPHOCYTES                 25                12 - 49 %           MONOCYTES                   9                 5 - 13 %            EOSINOPHILS                 2                 0 - 7 %             BASOPHILS                   0                 0 - 1 %             IMMATURE GRANULOCYTES       0                 0.0 - 0.5 %         ABS. NEUTROPHILS            5.1               1.8 - 8.0 K/*       ABS. LYMPHOCYTES            1.9               0.8 - 3.5 K/*       ABS. MONOCYTES              0.7               0.0 - 1.0 K/*       ABS.  EOSINOPHILS            0.2               0.0 - 0.4 K/*       ABS. BASOPHILS              0.0               0.0 - 0.1 K/*       ABS. IMM. GRANS.            0.0               0.00 - 0.04 *       DF                          AUTOMATED                        -TROPONIN I  Collection Time: 01/01/21  5:53 PM       Result                      Value             Ref Range           Troponin-I, Qt.             <0.05             <0.05 ng/mL    -SAMPLES BEING HELD  Collection Time: 01/01/21  5:53 PM       Result                      Value             Ref Range           SAMPLES BEING HELD          1 RED, 1 HANNAH                          COMMENT                                                       Add-on orders for these samples will be processed based on acceptable specimen integrity and analyte stability, which may vary by analyte. DECISION MAKING:  Nati Samuel is a 81 y.o. male who comes in as above. Patient is an 80-year-old male with past medical history of chronic kidney disease, depression, hypertension who presents today for episode of chest pain and abdominal pain. When he checked his blood pressure it was elevated at 173/128. This pain lasted about an hour and a half then resolved. No current pain. Pain was constant, no aggravating relieving factors, nonradiating, not related to exertion. No associated symptoms. Blood pressure currently 178/85. Patient resting comfortably, in no acute distress. CBC, CMP, troponin completed, show no clear etiology for symptoms. Discussed further work-up including imaging, serial troponin. Patient refuses to stay. Patient management discussed with attending physician. Patient signed out AMA with Chio Cardona RN as witness, risks of adverse events occurring including but not limited to MI, stroke, death. Patient aware. Please return to ED at anytime for continued work-up. Please follow-up with primary care physician and cardiology within 1 week.       IMPRESSION:  Chest pain, unspecified type  (primary encounter diagnosis)    DISPOSITION:  AMA      Discharge Medication List as of 1/1/2021  8:27 PM         Follow-up Information     Follow up With Specialties Details Why Contact Komal Wakefield MD Internal Medicine Schedule an appointment as soon   as possible for a visit in 1 week  [address] [facility] [address]  [phone]      [address] [facility] [address] [facility] Go to  If symptoms worsen [address] [facility] [address] [facility]    Vianney Holbrook MD Cardiology Schedule an appointment as soon as   possible for a visit in 1 week  [address] [phone]            The patient is asked to follow-up with their primary care provider in the next several days. They are to call tomorrow for an appointment. The patient is asked to return promptly for any increased concerns or worsening of symptoms. They can return to this emergency department or any other emergency department.          Amount and/or Complexity of Data Reviewed  Decide to obtain previous medical records or to obtain history from someone other than the patient: yes      ED Course as of Jan 01 2120 Fri Jan 01, 2021 2015 Troponin-I, Qt.: <0.05 [GG]   2027 Patient refused to stay for further evaluation, attending physician aware, patient signed AMA, risks including death, MI, stroke discussed with [address] RN as witness    [EK]      ED Course User Index  [EK] Ericka Parra PA-C  [GG] Jaren Wilde,        Procedures

## 2021-01-03 LAB
ATRIAL RATE: 288 BPM
CALCULATED R AXIS, ECG10: -80 DEGREES
CALCULATED T AXIS, ECG11: 63 DEGREES
DIAGNOSIS, 93000: NORMAL
Q-T INTERVAL, ECG07: 366 MS
QRS DURATION, ECG06: 90 MS
QTC CALCULATION (BEZET), ECG08: 483 MS
VENTRICULAR RATE, ECG03: 105 BPM

## 2021-01-04 ENCOUNTER — OFFICE VISIT (OUTPATIENT)
Dept: INTERNAL MEDICINE CLINIC | Age: 86
End: 2021-01-04
Payer: MEDICARE

## 2021-01-04 VITALS
RESPIRATION RATE: 16 BRPM | SYSTOLIC BLOOD PRESSURE: 141 MMHG | HEIGHT: 70 IN | WEIGHT: 204 LBS | OXYGEN SATURATION: 98 % | TEMPERATURE: 97.1 F | DIASTOLIC BLOOD PRESSURE: 105 MMHG | BODY MASS INDEX: 29.2 KG/M2 | HEART RATE: 96 BPM

## 2021-01-04 DIAGNOSIS — Z86.79 HISTORY OF ATRIAL FIBRILLATION: ICD-10-CM

## 2021-01-04 DIAGNOSIS — R07.9 CHEST PAIN, UNSPECIFIED TYPE: Primary | ICD-10-CM

## 2021-01-04 DIAGNOSIS — F33.1 MODERATE EPISODE OF RECURRENT MAJOR DEPRESSIVE DISORDER (HCC): ICD-10-CM

## 2021-01-04 PROCEDURE — G0463 HOSPITAL OUTPT CLINIC VISIT: HCPCS | Performed by: INTERNAL MEDICINE

## 2021-01-04 PROCEDURE — G8510 SCR DEP NEG, NO PLAN REQD: HCPCS | Performed by: INTERNAL MEDICINE

## 2021-01-04 PROCEDURE — G8427 DOCREV CUR MEDS BY ELIG CLIN: HCPCS | Performed by: INTERNAL MEDICINE

## 2021-01-04 PROCEDURE — 1101F PT FALLS ASSESS-DOCD LE1/YR: CPT | Performed by: INTERNAL MEDICINE

## 2021-01-04 PROCEDURE — G8536 NO DOC ELDER MAL SCRN: HCPCS | Performed by: INTERNAL MEDICINE

## 2021-01-04 PROCEDURE — G8419 CALC BMI OUT NRM PARAM NOF/U: HCPCS | Performed by: INTERNAL MEDICINE

## 2021-01-04 PROCEDURE — 99214 OFFICE O/P EST MOD 30 MIN: CPT | Performed by: INTERNAL MEDICINE

## 2021-01-04 PROCEDURE — G8755 DIAS BP > OR = 90: HCPCS | Performed by: INTERNAL MEDICINE

## 2021-01-04 PROCEDURE — G8753 SYS BP > OR = 140: HCPCS | Performed by: INTERNAL MEDICINE

## 2021-01-04 RX ORDER — METOPROLOL SUCCINATE 25 MG/1
25 TABLET, EXTENDED RELEASE ORAL DAILY
Qty: 90 TAB | Refills: 1 | Status: SHIPPED | OUTPATIENT
Start: 2021-01-04 | End: 2021-06-18

## 2021-01-04 NOTE — PROGRESS NOTES
Verified name and birth date for privacy precautions. Chart reviewed in preparation for today's visit.      Chief Complaint   Patient presents with    Hypertension     ED follow up           Health Maintenance Due   Topic    GLAUCOMA SCREENING Q2Y          Wt Readings from Last 3 Encounters:   01/04/21 204 lb (92.5 kg)   01/01/21 200 lb (90.7 kg)   11/16/20 201 lb (91.2 kg)     Temp Readings from Last 3 Encounters:   01/04/21 97.1 °F (36.2 °C) (Temporal)   01/01/21 (!) 96.2 °F (35.7 °C)   11/16/20 98.1 °F (36.7 °C) (Temporal)     BP Readings from Last 3 Encounters:   01/04/21 (!) 141/105   01/01/21 (!) 178/85   11/16/20 (!) 140/90     Pulse Readings from Last 3 Encounters:   01/04/21 96   01/01/21 92   11/16/20 74         Learning Assessment:  :     Learning Assessment 7/21/2020 11/1/2019 10/17/2018   PRIMARY LEARNER Patient Patient Patient   HIGHEST LEVEL OF EDUCATION - PRIMARY LEARNER  - - GRADUATED HIGH SCHOOL OR GED   BARRIERS PRIMARY LEARNER - - NONE   PRIMARY LANGUAGE ENGLISH ENGLISH ENGLISH   LEARNER PREFERENCE PRIMARY DEMONSTRATION DEMONSTRATION OTHER (COMMENT)   ANSWERED BY self self pt   RELATIONSHIP SELF SELF SELF       Depression Screening:  :     3 most recent PHQ Screens 1/4/2021   Little interest or pleasure in doing things Not at all   Feeling down, depressed, irritable, or hopeless Not at all   Total Score PHQ 2 0   Trouble falling or staying asleep, or sleeping too much -   Feeling tired or having little energy -   Poor appetite, weight loss, or overeating -   Feeling bad about yourself - or that you are a failure or have let yourself or your family down -   Trouble concentrating on things such as school, work, reading, or watching TV -   Moving or speaking so slowly that other people could have noticed; or the opposite being so fidgety that others notice -   Thoughts of being better off dead, or hurting yourself in some way -   PHQ 9 Score -   How difficult have these problems made it for you to do your work, take care of your home and get along with others -       Fall Risk Assessment:  :     Fall Risk Assessment, last 12 mths 1/4/2021   Able to walk? Yes   Fall in past 12 months? 0   Do you feel unsteady? 0   Are you worried about falling 0   Number of falls in past 12 months -   Fall with injury? -       Abuse Screening:  :     Abuse Screening Questionnaire 1/4/2021 10/17/2018   Do you ever feel afraid of your partner? N N   Are you in a relationship with someone who physically or mentally threatens you? N N   Is it safe for you to go home?  Ney Saunders

## 2021-01-04 NOTE — PROGRESS NOTES
HPI:  Dung Suazo is a 81y.o. year old male who is here for an emergency room follow-up visit. He was seen there on January 1 with an episode of abdominal and chest discomfort. It apparently lasted for about 4 hours and resolved before he got to the emergency room. At that point his blood pressure was elevated. His reading at home was 170/120. In the emergency room his blood pressure was 178/85. EKG and troponins were negative. He signed out AMA. He has had no further episodes of pain. No exertional nature to the pain. No change in bowel or bladder habits. No shortness of breath, diaphoresis, nausea, or vomiting. No fevers or chills. He is relatively sedentary. His blood pressures have been elevated at home. His previous orthostasis and dizziness have resolved. Does note that his depression has been more of a problem. He has an appointment to see the psychiatrist next week. He feels that he is not sure why he continues to hang around as there is not much to look forward to in life. He did just move in with his girlfriend. He has no specific suicidal plan. Past Medical History:   Diagnosis Date    Calculus of kidney >20 years    Chronic kidney disease ~ 1995    Chronic obstructive pulmonary disease ([facility] [address] 1995    Removed 2019    CRI (chronic renal insufficiency), stage 3 (moderate) 10/23/2019    Depression     Essential hypertension 10/23/2019    Hypertension >30 years    Kidney disease        Past Surgical History:   Procedure Laterality Date    HX CHOLECYSTECTOMY      HX COLONOSCOPY  2018    HX LITHOTRIPSY      HX PROSTATECTOMY         Prior to Admission medications    Medication Sig Start Date End Date Taking? Authorizing Provider   metoprolol succinate (TOPROL-XL) 25 mg XL tablet Take 1 Tab by mouth daily. 1/4/21  Yes Donell Johnson MD   memantine (Namenda) 10 mg tablet Take 1 Tab by mouth two (2) times a day.  11/19/20  Yes Donell Johnson MD   donepeziL (ARICEPT) 10 mg tablet Take 1 Tab by mouth nightly. 10/15/20  Yes Rocco Ya MD   busPIRone (BUSPAR) 5 mg tablet Take  by mouth two (2) times a day. Yes Provider, Historical   apixaban (Eliquis) 2.5 mg tablet Take 2.5 mg by mouth two (2) times a day. Yes Provider, Historical   calcium citrate-vitamin D3 (CITRACAL WITH VITAMIN D MAXIMUM) tablet Take  by mouth two (2) times a day. Yes Provider, Historical   ipratropium (ATROVENT) 42 mcg (0.06 %) nasal spray 2 Sprays by Both Nostrils route three (3) times daily as needed for Rhinitis. 6/15/20  Yes Charmaine Martinez MD   [facility]) 75 mg tablet Take 25 mg by mouth two (2) times a day. Yes Provider, Historical   traZODone (DESYREL) 150 mg tablet Take 150 mg by mouth nightly. Yes Provider, Historical   melatonin tab tablet Take 10 mg by mouth nightly. Yes Provider, Historical   multivitamin with iron tablet Take 1 Tab by mouth daily. Yes Provider, Historical   Celestia Bound [id number] mg-mg-million tab Take  by mouth. Yes Provider, Historical   traZODone (DESYREL) 100 mg tablet TAKE 1 TABLET BY MOUTH EVERY NIGHT AT BEDTIME AS NEEDED SLEEP 11/27/20 1/4/21  Graciela Krishna III, MD   desonide (TRIDESILON) 0.05 % cream Apply  to affected area two (2) times a day. 3/4/20 1/4/21  Graciela Krishna III, MD   metoprolol succinate (TOPROL-XL) 25 mg XL tablet Take 0.5 Tabs by mouth daily. 1/13/20 1/4/21  Graciela Krishna III, MD   sildenafil citrate (VIAGRA) 25 mg tablet Take 1 Tab by mouth as needed (prior to sexual activity).  5/20/19   Madelyn Price MD       Social History     Socioeconomic History    Marital status:      Spouse name: Not on file    Number of children: Not on file    Years of education: Not on file    Highest education level: Not on file   Occupational History    Not on file   Social Needs    Financial resource strain: Not on file    Food insecurity     Worry: Not on file     Inability: Not on file    Transportation needs     Medical: Not on file     Non-medical: Not on file   Tobacco Use    Smoking status: Former Smoker     Packs/day: 1.00     Years: 45.00     Pack years: 45.00     Quit date:      Years since quittin.0    Smokeless tobacco: Never Used   Substance and Sexual Activity    Alcohol use:  Yes     Alcohol/week: 0.0 standard drinks     Types: 4 Glasses of wine per week     Drinks per session: 1 or 2     Binge frequency: Weekly     Comment: weekly    Drug use: No    Sexual activity: Never   Lifestyle    Physical activity     Days per week: Not on file     Minutes per session: Not on file    Stress: Not on file   Relationships    Social connections     Talks on phone: Not on file     Gets together: Not on file     Attends Latter-day service: Not on file     Active member of club or organization: Not on file     Attends meetings of clubs or organizations: Not on file     Relationship status: Not on file    Intimate partner violence     Fear of current or ex partner: Not on file     Emotionally abused: Not on file     Physically abused: Not on file     Forced sexual activity: Not on file   Other Topics Concern    Not on file   Social History Narrative    Not on file          ROS  Per HPI    Visit Vitals  BP (!) 141/105 (BP 1 Location: Left arm, BP Patient Position: Sitting)   Pulse 96   Temp 97.1 °F (36.2 °C) (Temporal)   Resp 16   Ht 5' 10\" (1.778 m) Comment: patient refused to remove shoes, height from previous visit   Wt 204 lb (92.5 kg)   SpO2 98%   BMI 29.27 kg/m²         Physical Exam   Physical Examination: General appearance - alert, well appearing, and in no distress  Mouth - mucous membranes moist, pharynx normal without lesions  Neck - supple, no significant adenopathy  Lymphatics - no palpable lymphadenopathy, no hepatosplenomegaly  Chest - clear to auscultation, no wheezes, rales or rhonchi, symmetric air entry  Heart - normal rate and regular rhythm  Abdomen - soft, nontender, nondistended, no masses or organomegaly  Extremities - peripheral pulses normal, no pedal edema, no clubbing or cyanosis      Assessment/Plan:  Diagnoses and all orders for this visit:    1. Chest pain, unspecified type -  unclear if this is truly cardiac pain or not. Given his atrial for up history, will refer to cardiology for evaluation. He has not seen his usual cardiologist and does not want to go back to that practice. He only wants to go to a small practice. Will refer to [facility], Dr. Elías Hannah and will help arrange the appointment. Blood pressure is not well controlled and will increase metoprolol to a whole tablet daily.  -     REFERRAL TO CARDIOLOGY    2. History of atrial fibrillationrecent Holter monitor did reveal A. fib episodes with heart rate . Will increase metoprolol in the to see if that is helpful.  -     metoprolol succinate (TOPROL-XL) 25 mg XL tablet; Take 1 Tab by mouth daily.  -     REFERRAL TO CARDIOLOGY    3. Moderate episode of recurrent major depressive disorder (HCC)Per psychiatry. Discussed possible therapy and he will consider. Advised him to call back or return to office if symptoms worsen/change/persist.  Discussed expected course/resolution/complications of diagnosis in detail with patient. Medication risks/benefits/costs/interactions/alternatives discussed with patient. He was given an after visit summary which includes diagnoses, current medications, & vitals. He expressed understanding with the diagnosis and plan.

## 2021-01-13 DIAGNOSIS — F51.01 PRIMARY INSOMNIA: ICD-10-CM

## 2021-01-13 RX ORDER — VENLAFAXINE HYDROCHLORIDE 150 MG/1
CAPSULE, EXTENDED RELEASE ORAL
Qty: 90 CAP | Refills: 1 | OUTPATIENT
Start: 2021-01-13

## 2021-01-29 ENCOUNTER — IMMUNIZATION (OUTPATIENT)
Dept: INTERNAL MEDICINE CLINIC | Age: 86
End: 2021-01-29
Payer: MEDICARE

## 2021-01-29 DIAGNOSIS — Z23 ENCOUNTER FOR IMMUNIZATION: Primary | ICD-10-CM

## 2021-01-29 PROCEDURE — 91301 COVID-19, MRNA, LNP-S, PF, 100MCG/0.5ML DOSE(MODERNA): CPT | Performed by: FAMILY MEDICINE

## 2021-01-29 PROCEDURE — 0011A PR IMM ADMN SARSCOV2 100 MCG/0.5 ML 1ST DOSE: CPT | Performed by: FAMILY MEDICINE

## 2021-02-12 RX ORDER — MEMANTINE HYDROCHLORIDE 10 MG/1
TABLET ORAL
Qty: 60 TAB | Refills: 3 | Status: SHIPPED | OUTPATIENT
Start: 2021-02-12

## 2021-02-26 ENCOUNTER — IMMUNIZATION (OUTPATIENT)
Dept: INTERNAL MEDICINE CLINIC | Age: 86
End: 2021-02-26
Payer: MEDICARE

## 2021-02-26 DIAGNOSIS — Z23 ENCOUNTER FOR IMMUNIZATION: Primary | ICD-10-CM

## 2021-02-26 PROCEDURE — 0012A COVID-19, MRNA, LNP-S, PF, 100MCG/0.5ML DOSE(MODERNA): CPT | Performed by: FAMILY MEDICINE

## 2021-02-26 PROCEDURE — 91301 COVID-19, MRNA, LNP-S, PF, 100MCG/0.5ML DOSE(MODERNA): CPT | Performed by: FAMILY MEDICINE

## 2021-03-26 ENCOUNTER — OFFICE VISIT (OUTPATIENT)
Dept: INTERNAL MEDICINE CLINIC | Age: 86
End: 2021-03-26
Payer: MEDICARE

## 2021-03-26 VITALS
DIASTOLIC BLOOD PRESSURE: 99 MMHG | HEIGHT: 70 IN | OXYGEN SATURATION: 97 % | RESPIRATION RATE: 16 BRPM | HEART RATE: 62 BPM | WEIGHT: 201 LBS | BODY MASS INDEX: 28.77 KG/M2 | TEMPERATURE: 97.6 F | SYSTOLIC BLOOD PRESSURE: 160 MMHG

## 2021-03-26 DIAGNOSIS — I10 ESSENTIAL HYPERTENSION: ICD-10-CM

## 2021-03-26 DIAGNOSIS — N40.1 BENIGN PROSTATIC HYPERPLASIA WITH NOCTURIA: ICD-10-CM

## 2021-03-26 DIAGNOSIS — Z86.79 HISTORY OF ATRIAL FIBRILLATION: ICD-10-CM

## 2021-03-26 DIAGNOSIS — R09.82 POSTNASAL DRIP: ICD-10-CM

## 2021-03-26 DIAGNOSIS — M47.812 NECK ARTHRITIS: ICD-10-CM

## 2021-03-26 DIAGNOSIS — I95.1 ORTHOSTATIC HYPOTENSION: ICD-10-CM

## 2021-03-26 DIAGNOSIS — R35.1 BENIGN PROSTATIC HYPERPLASIA WITH NOCTURIA: ICD-10-CM

## 2021-03-26 DIAGNOSIS — F51.01 PRIMARY INSOMNIA: ICD-10-CM

## 2021-03-26 DIAGNOSIS — Z00.00 MEDICARE ANNUAL WELLNESS VISIT, SUBSEQUENT: Primary | ICD-10-CM

## 2021-03-26 DIAGNOSIS — N18.30 CRI (CHRONIC RENAL INSUFFICIENCY), STAGE 3 (MODERATE) (HCC): ICD-10-CM

## 2021-03-26 PROCEDURE — G8536 NO DOC ELDER MAL SCRN: HCPCS | Performed by: INTERNAL MEDICINE

## 2021-03-26 PROCEDURE — 99214 OFFICE O/P EST MOD 30 MIN: CPT | Performed by: INTERNAL MEDICINE

## 2021-03-26 PROCEDURE — G0439 PPPS, SUBSEQ VISIT: HCPCS | Performed by: INTERNAL MEDICINE

## 2021-03-26 PROCEDURE — G8427 DOCREV CUR MEDS BY ELIG CLIN: HCPCS | Performed by: INTERNAL MEDICINE

## 2021-03-26 PROCEDURE — 1101F PT FALLS ASSESS-DOCD LE1/YR: CPT | Performed by: INTERNAL MEDICINE

## 2021-03-26 PROCEDURE — G0463 HOSPITAL OUTPT CLINIC VISIT: HCPCS | Performed by: INTERNAL MEDICINE

## 2021-03-26 PROCEDURE — G8419 CALC BMI OUT NRM PARAM NOF/U: HCPCS | Performed by: INTERNAL MEDICINE

## 2021-03-26 PROCEDURE — G8432 DEP SCR NOT DOC, RNG: HCPCS | Performed by: INTERNAL MEDICINE

## 2021-03-26 PROCEDURE — G8755 DIAS BP > OR = 90: HCPCS | Performed by: INTERNAL MEDICINE

## 2021-03-26 PROCEDURE — G8753 SYS BP > OR = 140: HCPCS | Performed by: INTERNAL MEDICINE

## 2021-03-26 RX ORDER — LISINOPRIL 2.5 MG/1
2.5 TABLET ORAL DAILY
COMMUNITY
Start: 2021-03-26

## 2021-03-26 RX ORDER — LISINOPRIL 5 MG/1
2.5 TABLET ORAL DAILY
COMMUNITY
End: 2021-03-26 | Stop reason: DRUGHIGH

## 2021-03-26 RX ORDER — GUAIFENESIN 600 MG/1
600 TABLET, EXTENDED RELEASE ORAL 2 TIMES DAILY
COMMUNITY
Start: 2021-03-26

## 2021-03-26 NOTE — PROGRESS NOTES
Verified name and birth date for privacy precautions. Chart reviewed in preparation for today's visit.      Chief Complaint   Patient presents with   [address] Maintenance Due   Topic    Medicare Yearly Exam          Wt Readings from Last 3 Encounters:   03/26/21 201 lb (91.2 kg)   01/04/21 204 lb (92.5 kg)   01/01/21 200 lb (90.7 kg)     Temp Readings from Last 3 Encounters:   03/26/21 97.6 °F (36.4 °C) (Temporal)   01/04/21 97.1 °F (36.2 °C) (Temporal)   01/01/21 (!) 96.2 °F (35.7 °C)     BP Readings from Last 3 Encounters:   03/26/21 (!) 160/99   01/04/21 (!) 141/105   01/01/21 (!) 178/85     Pulse Readings from Last 3 Encounters:   03/26/21 62   01/04/21 96   01/01/21 92         Learning Assessment:  :     Learning Assessment 7/21/2020 11/1/2019 10/17/2018   PRIMARY LEARNER Patient Patient Patient   HIGHEST LEVEL OF EDUCATION - PRIMARY LEARNER  - - GRADUATED HIGH SCHOOL OR GED   BARRIERS PRIMARY LEARNER - - NONE   PRIMARY LANGUAGE ENGLISH ENGLISH ENGLISH   LEARNER PREFERENCE PRIMARY DEMONSTRATION DEMONSTRATION OTHER (COMMENT)   ANSWERED BY self self pt   RELATIONSHIP SELF SELF SELF       Depression Screening:  :     3 most recent PHQ Screens 3/26/2021   Little interest or pleasure in doing things Not at all   Feeling down, depressed, irritable, or hopeless Not at all   Total Score PHQ 2 0   Trouble falling or staying asleep, or sleeping too much -   Feeling tired or having little energy -   Poor appetite, weight loss, or overeating -   Feeling bad about yourself - or that you are a failure or have let yourself or your family down -   Trouble concentrating on things such as school, work, reading, or watching TV -   Moving or speaking so slowly that other people could have noticed; or the opposite being so fidgety that others notice -   Thoughts of being better off dead, or hurting yourself in some way -   PHQ 9 Score -   How difficult have these problems made it for you to do your work, take care of your home and get along with others -       Fall Risk Assessment:  :     Fall Risk Assessment, last 12 mths 3/26/2021   Able to walk? Yes   Fall in past 12 months? 0   Do you feel unsteady? 0   Are you worried about falling 0   Number of falls in past 12 months -   Fall with injury? -       Abuse Screening:  :     Abuse Screening Questionnaire 3/26/2021 1/4/2021 10/17/2018   Do you ever feel afraid of your partner? N N N   Are you in a relationship with someone who physically or mentally threatens you? N N N   Is it safe for you to go home?  Ayanna Horne

## 2021-03-26 NOTE — PATIENT INSTRUCTIONS
Low Back Arthritis: Exercises Introduction Here are some examples of typical rehabilitation exercises for your condition. Start each exercise slowly. Ease off the exercise if you start to have pain. Your doctor or physical therapist will tell you when you can start these exercises and which ones will work best for you. When you are not being active, find a comfortable position for rest. Some people are comfortable on the floor or a medium-firm bed with a small pillow under their head and another under their knees. Some people prefer to lie on their side with a pillow between their knees. Don't stay in one position for too long. Take short walks (10 to 20 minutes) every 2 to 3 hours. Avoid slopes, hills, and stairs until you feel better. Walk only distances you can manage without pain, especially leg pain. How to do the exercises Pelvic tilt 1. Lie on your back with your knees bent. 2. \"Brace\" your stomachtighten your muscles by pulling in and imagining your belly button moving toward your spine. 3. Press your lower back into the floor. You should feel your hips and pelvis rock back. 4. Hold for 6 seconds while breathing smoothly. 5. Relax and allow your pelvis and hips to rock forward. 6. Repeat 8 to 12 times. Back stretches 1. Get down on your hands and knees on the floor. 2. Relax your head and allow it to droop. Round your back up toward the ceiling until you feel a nice stretch in your upper, middle, and lower back. Hold this stretch for as long as it feels comfortable, or about 15 to 30 seconds. 3. Return to the starting position with a flat back while you are on your hands and knees. 4. Let your back sway by pressing your stomach toward the floor. Lift your buttocks toward the ceiling. 5. Hold this position for 15 to 30 seconds. 6. Repeat 2 to 4 times. Follow-up care is a key part of your treatment and safety.  Be sure to make and go to all appointments, and call your doctor if you are having problems. It's also a good idea to know your test results and keep a list of the medicines you take. Where can you learn more? Go to http://www.gray.com/ Enter J404 in the search box to learn more about \"Low Back Arthritis: Exercises. \" Current as of: March 2, 2020               Content Version: 12.6 © 8545-3568 [employer]. Care instructions adapted under license by [employer] (which disclaims liability or warranty for this information). If you have questions about a medical condition or this instruction, always ask your healthcare professional. [place] [address] any warranty or liability for your use of this information. Medicare Wellness Visit, Male The best way to live healthy is to have a lifestyle where you eat a well-balanced diet, exercise regularly, limit alcohol use, and quit all forms of tobacco/nicotine, if applicable. Regular preventive services are another way to keep healthy. Preventive services (vaccines, screening tests, monitoring & exams) can help personalize your care plan, which helps you manage your own care. Screening tests can find health problems at the earliest stages, when they are easiest to treat. [facility] follows the current, evidence-based guidelines published by the [place] [employer] [place] (USPSTF) when recommending preventive services for our patients. Because we follow these guidelines, sometimes recommendations change over time as research supports it. (For example, a prostate screening blood test is no longer routinely recommended for men with no symptoms). Of course, you and your doctor may decide to screen more often for some diseases, based on your risk and co-morbidities (chronic disease you are already diagnosed with). Preventive services for you include: - Medicare offers their members a free annual wellness visit, which is time for you and your primary care provider to discuss and plan for your preventive service needs. Take advantage of this benefit every year! 
-All adults over age 72 should receive the recommended pneumonia vaccines. Current USPSTF guidelines recommend a series of two vaccines for the best pneumonia protection.  
-All adults should have a flu vaccine yearly and tetanus vaccine every 10 years. 
-All adults age 48 and older should receive the shingles vaccines (series of two vaccines). -All adults age 38-68 who are overweight should have a diabetes screening test once every three years.  
-Other screening tests & preventive services for persons with diabetes include: an eye exam to screen for diabetic retinopathy, a kidney function test, a foot exam, and stricter control over your cholesterol.  
-Cardiovascular screening for adults with routine risk involves an electrocardiogram (ECG) at intervals determined by the provider.  
-Colorectal cancer screening should be done for adults age 54-65 with no increased risk factors for colorectal cancer. There are a number of acceptable methods of screening for this type of cancer. Each test has its own benefits and drawbacks. Discuss with your provider what is most appropriate for you during your annual wellness visit. The different tests include: colonoscopy (considered the best screening method), a fecal occult blood test, a fecal DNA test, and sigmoidoscopy. 
-All adults born between [facility] should be screened once for Hepatitis C. 
-An Abdominal Aortic Aneurysm (AAA) Screening is recommended for men age 73-68 who has ever smoked in their lifetime. Here is a list of your current Health Maintenance items (your personalized list of preventive services) with a due date: There are no preventive care reminders to display for this patient.

## 2021-03-27 NOTE — PROGRESS NOTES
This is the Subsequent Medicare Annual Wellness Exam, performed 12 months or more after the Initial AWV or the last Subsequent AWV    I have reviewed the patient's medical history in detail and updated the computerized patient record. As well as a follow up of his health issues. Some postnasal drip daily. Some crepitus in the neck without pain. Some lower back pain off and on increased with position. Some increased urine frequency. ROS - negative except per above.      Physical Examination: General appearance - alert, well appearing, and in no distress  Ears - bilateral TM's and external ear canals normal  Nose - normal and patent, no erythema, discharge or polyps  Mouth - mucous membranes moist, pharynx normal without lesions  Neck - supple, no significant adenopathy  Lymphatics - no palpable lymphadenopathy, no hepatosplenomegaly  Chest - clear to auscultation, no wheezes, rales or rhonchi, symmetric air entry  Heart - normal rate and regular rhythm  Abdomen - soft, nontender, nondistended, no masses or organomegaly  Back exam - full range of motion, no tenderness, palpable spasm or pain on motion, sacroiliac joints and sciatic notches nontender, normal reflexes and strength bilateral lower extremities  Neurological - alert, oriented, normal speech, no focal findings or movement disorder noted  Musculoskeletal - no joint tenderness, deformity or swelling  Extremities - peripheral pulses normal, no pedal edema, no clubbing or cyanosis      Depression Risk Factor Screening:     3 most recent PHQ Screens 3/26/2021   Little interest or pleasure in doing things Not at all   Feeling down, depressed, irritable, or hopeless Not at all   Total Score PHQ 2 0   Trouble falling or staying asleep, or sleeping too much -   Feeling tired or having little energy -   Poor appetite, weight loss, or overeating -   Feeling bad about yourself - or that you are a failure or have let yourself or your family down -   Trouble concentrating on things such as school, work, reading, or watching TV -   Moving or speaking so slowly that other people could have noticed; or the opposite being so fidgety that others notice -   Thoughts of being better off dead, or hurting yourself in some way -   PHQ 9 Score -   How difficult have these problems made it for you to do your work, take care of your home and get along with others -       Alcohol Risk Screen    Do you average more than 1 drink per night or more than 7 drinks a week: No    In the past three months have you have had more than 4 drinks containing alcohol on one occasion: No        Functional Ability and Level of Safety:    Hearing: Hearing is good. Activities of Daily Living: The home contains: no safety equipment. Patient does total self care      Ambulation: with no difficulty     Fall Risk:  Fall Risk Assessment, last 12 mths 3/26/2021   Able to walk? Yes   Fall in past 12 months? 0   Do you feel unsteady? 0   Are you worried about falling 0   Number of falls in past 12 months -   Fall with injury? -      Abuse Screen:  Patient is not abused       Cognitive Screening    Has your family/caregiver stated any concerns about your memory: no         Assessment/Plan   Education and counseling provided:  Are appropriate based on today's review and evaluation  Diabetes screening test    Diagnoses and all orders for this visit:    1. Primary insomnia - continue same meds    2. Essential hypertension - BP up today. Had ACE added recently by cardiology. Will check BP and report to cardiology  -     METABOLIC PANEL, COMPREHENSIVE; Future  -     CBC WITH AUTOMATED DIFF; Future  -     LIPID PANEL; Future  -     TSH 3RD GENERATION; Future    3. CRI (chronic renal insufficiency), stage 3 (moderate) - check levels and consider renal follow up. -     METABOLIC PANEL, COMPREHENSIVE; Future  -     CBC WITH AUTOMATED DIFF; Future  -     LIPID PANEL; Future  -     TSH 3RD GENERATION; Future    4. Orthostatic hypotension - stable    5. Benign prostatic hyperplasia with nocturia - stable    6. History of atrial fibrillation - on chronic anticoagulants and stable    7. Neck arthritis - stretching exercises and PT if needed.     8. Postnasal drip - trial of mucinex and see if this helps.     9. Medicare annual wellness visit, subsequent        Health Maintenance Due   There are no preventive care reminders to display for this patient.    Patient Care Team   Patient Care Team:  Skip Gil III, MD as PCP - General (Internal Medicine)  Skip Gil III, MD as PCP - [facility] Empaneled Provider    History     Patient Active Problem List   Diagnosis Code   • Erectile dysfunction N52.9   • Irritation of external ear canal, bilateral H61.893   • Primary insomnia F51.01   • Essential hypertension I10   • CRI (chronic renal insufficiency), stage 3 (moderate) N18.30   • Orthostatic hypotension I95.1   • Benign prostatic hyperplasia with nocturia N40.1, R35.1     Past Medical History:   Diagnosis Date   • Calculus of kidney >20 years   • Chronic kidney disease ~ 1995   • Chronic obstructive pulmonary disease (HCC) 1995    Removed 2019   • CRI (chronic renal insufficiency), stage 3 (moderate) 10/23/2019   • Depression    • Essential hypertension 10/23/2019   • Hypertension >30 years   • Kidney disease       Past Surgical History:   Procedure Laterality Date   • HX CHOLECYSTECTOMY     • HX COLONOSCOPY  2018   • HX LITHOTRIPSY     • HX PROSTATECTOMY       Current Outpatient Medications   Medication Sig Dispense Refill   • lisinopriL (PRINIVIL, ZESTRIL) 2.5 mg tablet Take 1 Tab by mouth daily.     • guaiFENesin ER (MUCINEX) 600 mg ER tablet Take 1 Tab by mouth two (2) times a day.     • memantine (NAMENDA) 10 mg tablet TAKE 1 TABLET BY MOUTH TWICE DAILY 60 Tab 3   • metoprolol succinate (TOPROL-XL) 25 mg XL tablet Take 1 Tab by mouth daily. 90 Tab 1   • donepeziL (ARICEPT) 10 mg tablet Take 1 Tab by mouth nightly. 90 Tab 2   busPIRone (BUSPAR) 5 mg tablet Take  by mouth two (2) times a day.  apixaban (Eliquis) 2.5 mg tablet Take 2.5 mg by mouth two (2) times a day.  calcium citrate-vitamin D3 (CITRACAL WITH VITAMIN D MAXIMUM) tablet Take  by mouth two (2) times a day.  venlafaxine (EFFEXOR) 75 mg tablet Take 25 mg by mouth two (2) times a day.  traZODone (DESYREL) 150 mg tablet Take 150 mg by mouth nightly.  melatonin tab tablet Take 10 mg by mouth nightly.  multivitamin with iron tablet Take 1 Tab by mouth daily. No Known Allergies    Family History   Problem Relation Age of Onset    Hypertension Mother         cerebral hemmorage     Stroke Mother     Prostate Cancer Father     Cancer Father         Prostate Cancer    Suicide Brother     Suicide Daughter      Social History     Tobacco Use    Smoking status: Former Smoker     Packs/day: 1.00     Years: 45.00     Pack years: 45.00     Quit date:      Years since quittin.2    Smokeless tobacco: Never Used   Substance Use Topics    Alcohol use:  Yes     Alcohol/week: 0.0 standard drinks     Types: 4 Glasses of wine per week     Drinks per session: 1 or 2     Binge frequency: Weekly     Comment: weekly

## 2021-03-30 ENCOUNTER — APPOINTMENT (OUTPATIENT)
Dept: INTERNAL MEDICINE CLINIC | Age: 86
End: 2021-03-30

## 2021-03-30 DIAGNOSIS — N18.30 CRI (CHRONIC RENAL INSUFFICIENCY), STAGE 3 (MODERATE) (HCC): ICD-10-CM

## 2021-03-30 DIAGNOSIS — I10 ESSENTIAL HYPERTENSION: ICD-10-CM

## 2021-03-31 LAB
ALBUMIN SERPL-MCNC: 3.8 G/DL (ref 3.5–5)
ALBUMIN/GLOB SERPL: 1.1 {RATIO} (ref 1.1–2.2)
ALP SERPL-CCNC: 76 U/L (ref 45–117)
ALT SERPL-CCNC: 31 U/L (ref 12–78)
ANION GAP SERPL CALC-SCNC: 7 MMOL/L (ref 5–15)
AST SERPL-CCNC: 22 U/L (ref 15–37)
BASOPHILS # BLD: 0 K/UL (ref 0–0.1)
BASOPHILS NFR BLD: 0 % (ref 0–1)
BILIRUB SERPL-MCNC: 0.7 MG/DL (ref 0.2–1)
BUN SERPL-MCNC: 42 MG/DL (ref 6–20)
BUN/CREAT SERPL: 22 (ref 12–20)
CALCIUM SERPL-MCNC: 9.4 MG/DL (ref 8.5–10.1)
CHLORIDE SERPL-SCNC: 106 MMOL/L (ref 97–108)
CHOLEST SERPL-MCNC: 215 MG/DL
CO2 SERPL-SCNC: 27 MMOL/L (ref 21–32)
CREAT SERPL-MCNC: 1.9 MG/DL (ref 0.7–1.3)
DIFFERENTIAL METHOD BLD: ABNORMAL
EOSINOPHIL # BLD: 0.1 K/UL (ref 0–0.4)
EOSINOPHIL NFR BLD: 2 % (ref 0–7)
ERYTHROCYTE [DISTWIDTH] IN BLOOD BY AUTOMATED COUNT: 13 % (ref 11.5–14.5)
GLOBULIN SER CALC-MCNC: 3.5 G/DL (ref 2–4)
GLUCOSE SERPL-MCNC: 117 MG/DL (ref 65–100)
HCT VFR BLD AUTO: 51.4 % (ref 36.6–50.3)
HDLC SERPL-MCNC: 55 MG/DL
HDLC SERPL: 3.9 {RATIO} (ref 0–5)
HGB BLD-MCNC: 16.7 G/DL (ref 12.1–17)
IMM GRANULOCYTES # BLD AUTO: 0 K/UL (ref 0–0.04)
IMM GRANULOCYTES NFR BLD AUTO: 0 % (ref 0–0.5)
LDLC SERPL CALC-MCNC: 129.6 MG/DL (ref 0–100)
LIPID PROFILE,FLP: ABNORMAL
LYMPHOCYTES # BLD: 2 K/UL (ref 0.8–3.5)
LYMPHOCYTES NFR BLD: 22 % (ref 12–49)
MCH RBC QN AUTO: 29.2 PG (ref 26–34)
MCHC RBC AUTO-ENTMCNC: 32.5 G/DL (ref 30–36.5)
MCV RBC AUTO: 90 FL (ref 80–99)
MONOCYTES # BLD: 0.6 K/UL (ref 0–1)
MONOCYTES NFR BLD: 7 % (ref 5–13)
NEUTS SEG # BLD: 6.2 K/UL (ref 1.8–8)
NEUTS SEG NFR BLD: 69 % (ref 32–75)
NRBC # BLD: 0 K/UL (ref 0–0.01)
NRBC BLD-RTO: 0 PER 100 WBC
PLATELET # BLD AUTO: 139 K/UL (ref 150–400)
PMV BLD AUTO: 11.1 FL (ref 8.9–12.9)
POTASSIUM SERPL-SCNC: 5.1 MMOL/L (ref 3.5–5.1)
PROT SERPL-MCNC: 7.3 G/DL (ref 6.4–8.2)
RBC # BLD AUTO: 5.71 M/UL (ref 4.1–5.7)
SODIUM SERPL-SCNC: 140 MMOL/L (ref 136–145)
TRIGL SERPL-MCNC: 152 MG/DL (ref ?–150)
TSH SERPL DL<=0.05 MIU/L-ACNC: 1.22 UIU/ML (ref 0.36–3.74)
VLDLC SERPL CALC-MCNC: 30.4 MG/DL
WBC # BLD AUTO: 9 K/UL (ref 4.1–11.1)

## 2021-04-01 ENCOUNTER — TELEPHONE (OUTPATIENT)
Dept: NEUROLOGY | Age: 86
End: 2021-04-01

## 2021-04-01 NOTE — TELEPHONE ENCOUNTER
I called patient and attempted to get him to verify his date of birth to ensure I was speaking to the correct patient. Patient became irate and refused to give it to me. He yelled \"You called me! \"  He then kept yelling \"ma'am, ma'am, ma'am\" into the phone several times before hanging up on me. Zeina Cervantes, Practice Administrator, was notified about patient's suicide threats. She called 911 and had welfare check done on patient. I called patient's daughter, Smith Guzman, and reported all of this information to her. (HIPPA verified). She stated patient doesn't own a gun and she agreed to go to his home at this time.

## 2021-04-01 NOTE — TELEPHONE ENCOUNTER
Pt calling back angry that we called the [place] police on him and now they are after him, stated we need to fix it. He said he did not say he was going to commit suicide, he stated he said he was \"considering committing suicide. \" Pt is yelling that he wants to speak to Dr Lashon Casper and wants a call by the end of the day.

## 2021-04-01 NOTE — TELEPHONE ENCOUNTER
I called the patient and he mentioned that police had come to his house and had now left. He was quite upset about this. He wanted me to call the police and let them know that it was a miscommunication. Before I could explain anything, he hung up the phone.

## 2021-04-01 NOTE — TELEPHONE ENCOUNTER
Pt was transferred by [facility] due to the fact that he told her he was going to commit suicide. When pt was connected to me I asked pt what was going on pt said \"my memory has declined so much in the last 3 days and I cannot live like this, I will commit suicide. \" I informed pt that I was going to get a nurse, pt got very frustrated and said \"you're not a nurse, I just gave you clinical information and your not even qualified to handle it. \" I informed pt I was going to put him on a brief hold and get a nurse, while on hold pt hung up. I went to the back to get the nurse and she called pt back.

## 2021-04-01 NOTE — TELEPHONE ENCOUNTER
Patient calling back demanding to speak with the person who called the police on him. I told him I didn't know who called the police. I spoke w/ Dr. Obed Oppenheim and he stated he will call him before the end of the day.

## 2021-04-05 ENCOUNTER — OFFICE VISIT (OUTPATIENT)
Dept: INTERNAL MEDICINE CLINIC | Age: 86
End: 2021-04-05
Payer: MEDICARE

## 2021-04-05 VITALS
DIASTOLIC BLOOD PRESSURE: 90 MMHG | WEIGHT: 200 LBS | RESPIRATION RATE: 16 BRPM | SYSTOLIC BLOOD PRESSURE: 142 MMHG | HEART RATE: 67 BPM | BODY MASS INDEX: 28.63 KG/M2 | OXYGEN SATURATION: 100 % | TEMPERATURE: 97.5 F | HEIGHT: 70 IN

## 2021-04-05 DIAGNOSIS — F41.1 GAD (GENERALIZED ANXIETY DISORDER): ICD-10-CM

## 2021-04-05 DIAGNOSIS — R41.3 MEMORY LOSS: Primary | ICD-10-CM

## 2021-04-05 DIAGNOSIS — F51.01 PRIMARY INSOMNIA: ICD-10-CM

## 2021-04-05 PROCEDURE — G8427 DOCREV CUR MEDS BY ELIG CLIN: HCPCS | Performed by: INTERNAL MEDICINE

## 2021-04-05 PROCEDURE — G8753 SYS BP > OR = 140: HCPCS | Performed by: INTERNAL MEDICINE

## 2021-04-05 PROCEDURE — G8536 NO DOC ELDER MAL SCRN: HCPCS | Performed by: INTERNAL MEDICINE

## 2021-04-05 PROCEDURE — 1101F PT FALLS ASSESS-DOCD LE1/YR: CPT | Performed by: INTERNAL MEDICINE

## 2021-04-05 PROCEDURE — 99214 OFFICE O/P EST MOD 30 MIN: CPT | Performed by: INTERNAL MEDICINE

## 2021-04-05 PROCEDURE — G8510 SCR DEP NEG, NO PLAN REQD: HCPCS | Performed by: INTERNAL MEDICINE

## 2021-04-05 PROCEDURE — G0463 HOSPITAL OUTPT CLINIC VISIT: HCPCS | Performed by: INTERNAL MEDICINE

## 2021-04-05 PROCEDURE — G8755 DIAS BP > OR = 90: HCPCS | Performed by: INTERNAL MEDICINE

## 2021-04-05 PROCEDURE — G8419 CALC BMI OUT NRM PARAM NOF/U: HCPCS | Performed by: INTERNAL MEDICINE

## 2021-04-05 RX ORDER — BUSPIRONE HYDROCHLORIDE 10 MG/1
10 TABLET ORAL 3 TIMES DAILY
Qty: 180 TAB | Refills: 1 | Status: SHIPPED | OUTPATIENT
Start: 2021-04-05

## 2021-04-05 RX ORDER — VENLAFAXINE 75 MG/1
75 TABLET ORAL 2 TIMES DAILY
COMMUNITY
Start: 2021-04-05

## 2021-04-05 NOTE — PROGRESS NOTES
Verified name and birth date for privacy precautions. Chart reviewed in preparation for today's visit. Chief Complaint   Patient presents with    Memory Loss          There are no preventive care reminders to display for this patient.       Wt Readings from Last 3 Encounters:   04/05/21 200 lb (90.7 kg)   03/26/21 201 lb (91.2 kg)   01/04/21 204 lb (92.5 kg)     Temp Readings from Last 3 Encounters:   04/05/21 97.5 °F (36.4 °C) (Temporal)   03/26/21 97.6 °F (36.4 °C) (Temporal)   01/04/21 97.1 °F (36.2 °C) (Temporal)     BP Readings from Last 3 Encounters:   04/05/21 (!) 142/90   03/26/21 (!) 160/99   01/04/21 (!) 141/105     Pulse Readings from Last 3 Encounters:   04/05/21 67   03/26/21 62   01/04/21 96         Learning Assessment:  :     Learning Assessment 7/21/2020 11/1/2019 10/17/2018   PRIMARY LEARNER Patient Patient Patient   HIGHEST LEVEL OF EDUCATION - PRIMARY LEARNER  - - GRADUATED HIGH SCHOOL OR GED   BARRIERS PRIMARY LEARNER - - NONE   PRIMARY LANGUAGE ENGLISH ENGLISH ENGLISH   LEARNER PREFERENCE PRIMARY DEMONSTRATION DEMONSTRATION OTHER (COMMENT)   ANSWERED BY self self pt   RELATIONSHIP SELF SELF SELF       Depression Screening:  :     3 most recent PHQ Screens 4/5/2021   Little interest or pleasure in doing things Not at all   Feeling down, depressed, irritable, or hopeless Not at all   Total Score PHQ 2 0   Trouble falling or staying asleep, or sleeping too much -   Feeling tired or having little energy -   Poor appetite, weight loss, or overeating -   Feeling bad about yourself - or that you are a failure or have let yourself or your family down -   Trouble concentrating on things such as school, work, reading, or watching TV -   Moving or speaking so slowly that other people could have noticed; or the opposite being so fidgety that others notice -   Thoughts of being better off dead, or hurting yourself in some way -   PHQ 9 Score -   How difficult have these problems made it for you to do your work, take care of your home and get along with others -       Fall Risk Assessment:  :     Fall Risk Assessment, last 12 mths 3/26/2021   Able to walk? Yes   Fall in past 12 months? 0   Do you feel unsteady? 0   Are you worried about falling 0   Number of falls in past 12 months -   Fall with injury? -       Abuse Screening:  :     Abuse Screening Questionnaire 4/5/2021 3/26/2021 1/4/2021 10/17/2018   Do you ever feel afraid of your partner? N N N N   Are you in a relationship with someone who physically or mentally threatens you? N N N N   Is it safe for you to go home?  Chilango Hylton

## 2021-04-05 NOTE — PROGRESS NOTES
HPI:  Yeimy Atwood is a 81y.o. year old male who is here for a routine visit:    Presents for a follow-up visit. Apparently last week he was calling the neurologist to discuss his memory loss issues. He has become increasingly worried about the possibility of dementia causing long-term consequences to his life. His grandfather lived to 80 and was disruptive due to his dementia late in his life. He does not want to do that to his daughters. He called the neurologist and mention to them that he had thought about taking his life. For that reason they called the police who went to his house and he was not there. He then got in an verbal altercation with the neurologist on the phone. He has been known to have issues with anger and irritability. He has promised his girlfriend that he will not mention suicide again. He does not have a plan of suicide and promised that he would reach out if he did. He wanted to discuss today the options for care for people who are end-stage with dementia. He is currently still taking care of his own finances, driving without difficulty, and only has issues with short-term memory problems. Past Medical History:   Diagnosis Date    Calculus of kidney >20 years    Chronic kidney disease ~ 1995    Chronic obstructive pulmonary disease ([address] 1995    Removed 2019    CRI (chronic renal insufficiency), stage 3 (moderate) ([address] 10/23/2019    Depression     Essential hypertension 10/23/2019    Hypertension >30 years    Kidney disease        Past Surgical History:   Procedure Laterality Date    HX CHOLECYSTECTOMY      HX COLONOSCOPY  2018    HX LITHOTRIPSY      HX PROSTATECTOMY         Prior to Admission medications    Medication Sig Start Date End Date Taking? Authorizing Provider   venlafaxine [facility]) 75 mg tablet Take 1 Tab by mouth two (2) times a day.  4/5/21  Yes Prince Gardner MD   busPIRone (BUSPAR) 10 mg tablet Take 1 Tab by mouth three (3) times daily. 21  Yes áBrbara Germain MD   lisinopriL (PRINIVIL, ZESTRIL) 2.5 mg tablet Take 1 Tab by mouth daily. 3/26/21  Yes Bárbara Germain MD   guaiFENesin ER (MUCINEX) 600 mg ER tablet Take 1 Tab by mouth two (2) times a day. 3/26/21  Yes Bárbara Germain MD   memantine (NAMENDA) 10 mg tablet TAKE 1 TABLET BY MOUTH TWICE DAILY 21  Yes Gisella Almanzar III, MD   metoprolol succinate (TOPROL-XL) 25 mg XL tablet Take 1 Tab by mouth daily. 21  Yes Bárbara Germain MD   donepeziL (ARICEPT) 10 mg tablet Take 1 Tab by mouth nightly. 10/15/20  Yes Marjorie Galicia MD   apixaban (Eliquis) 2.5 mg tablet Take 2.5 mg by mouth two (2) times a day. Yes Provider, Historical   calcium citrate-vitamin D3 (CITRACAL WITH VITAMIN D MAXIMUM) tablet Take  by mouth two (2) times a day. Yes Provider, Historical   traZODone (DESYREL) 150 mg tablet Take 150 mg by mouth nightly. Yes Provider, Historical   melatonin tab tablet Take 10 mg by mouth nightly. Yes Provider, Historical   multivitamin with iron tablet Take 1 Tab by mouth daily. Yes Provider, Historical   busPIRone (BUSPAR) 5 mg tablet Take  by mouth two (2) times a day. 21  Provider, Historical   venlafaxine (EFFEXOR) 75 mg tablet Take 25 mg by mouth two (2) times a day.   21  Provider, Historical       Social History     Socioeconomic History    Marital status:      Spouse name: Not on file    Number of children: Not on file    Years of education: Not on file    Highest education level: Not on file   Occupational History    Not on file   Social Needs    Financial resource strain: Not on file    Food insecurity     Worry: Not on file     Inability: Not on file    Transportation needs     Medical: Not on file     Non-medical: Not on file   Tobacco Use    Smoking status: Former Smoker     Packs/day: 1.00     Years: 45.00     Pack years: 45.00     Quit date:      Years since quittin.2    Smokeless tobacco: Never Used   Substance and Sexual Activity    Alcohol use: Yes     Alcohol/week: 0.0 standard drinks     Types: 4 Glasses of wine per week     Drinks per session: 1 or 2     Binge frequency: Weekly     Comment: weekly    Drug use: No    Sexual activity: Never   Lifestyle    Physical activity     Days per week: Not on file     Minutes per session: Not on file    Stress: Not on file   Relationships    Social connections     Talks on phone: Not on file     Gets together: Not on file     Attends Congregational service: Not on file     Active member of club or organization: Not on file     Attends meetings of clubs or organizations: Not on file     Relationship status: Not on file    Intimate partner violence     Fear of current or ex partner: Not on file     Emotionally abused: Not on file     Physically abused: Not on file     Forced sexual activity: Not on file   Other Topics Concern    Not on file   Social History Narrative    Not on file          ROS  Per HPI    Visit Vitals  BP (!) 142/90 (BP 1 Location: Right arm, BP Patient Position: Sitting, BP Cuff Size: Large adult)   Pulse 67   Temp 97.5 °F (36.4 °C) (Temporal)   Resp 16   Ht 5' 10\" (1.778 m) Comment: obtained at preious officed visit   Wt 200 lb (90.7 kg)   SpO2 100%   BMI 28.70 kg/m²         Physical Exam   Physical Examination: General appearance - alert, well appearing, and in no distress  Mental status - alert, oriented to person, place, and time      Assessment/Plan:  Diagnoses and all orders for this visit:    1. Memory loss -on maximum therapy. At this point continue Aricept and Namenda. We did discussed options for memory care should he have the need develop. I strongly encouraged him to discuss this with his daughter and his girlfriend. He promised he would reach out should he have serious thoughts of suicide. 2. Primary insomnia    3. STEFANIA (generalized anxiety disorder)-we will increase BuSpar to 10 mg twice a day.   He will follow-up here in a month to discuss progress. Other orders  -     busPIRone (BUSPAR) 10 mg tablet; Take 1 Tab by mouth three (3) times daily. Advised him to call back or return to office if symptoms worsen/change/persist.  Discussed expected course/resolution/complications of diagnosis in detail with patient. Medication risks/benefits/costs/interactions/alternatives discussed with patient. He was given an after visit summary which includes diagnoses, current medications, & vitals. He expressed understanding with the diagnosis and plan.

## 2021-05-27 ENCOUNTER — PATIENT MESSAGE (OUTPATIENT)
Dept: INTERNAL MEDICINE CLINIC | Age: 86
End: 2021-05-27

## 2021-05-27 RX ORDER — IPRATROPIUM BROMIDE 42 UG/1
2 SPRAY, METERED NASAL
Qty: 15 ML | Refills: 5 | Status: SHIPPED | OUTPATIENT
Start: 2021-05-27

## 2021-06-04 ENCOUNTER — TELEPHONE (OUTPATIENT)
Dept: INTERNAL MEDICINE CLINIC | Age: 86
End: 2021-06-04

## 2021-06-04 ENCOUNTER — DOCUMENTATION ONLY (OUTPATIENT)
Dept: INTERNAL MEDICINE CLINIC | Age: 86
End: 2021-06-04

## 2021-06-04 DIAGNOSIS — G31.84 MILD COGNITIVE IMPAIRMENT: ICD-10-CM

## 2021-06-04 RX ORDER — DONEPEZIL HYDROCHLORIDE 10 MG/1
10 TABLET, FILM COATED ORAL
Qty: 90 TABLET | Refills: 0 | OUTPATIENT
Start: 2021-06-04 | End: 2022-01-20

## 2021-06-04 RX ORDER — DONEPEZIL HYDROCHLORIDE 10 MG/1
10 TABLET, FILM COATED ORAL
Qty: 90 TABLET | Refills: 2 | Status: SHIPPED | OUTPATIENT
Start: 2021-06-04 | End: 2021-06-04 | Stop reason: SDUPTHER

## 2021-06-04 NOTE — TELEPHONE ENCOUNTER
Received incoming call from Director of Patient Advocacy with [place]. Patient presented at hospital with concerns of not having his Aricept as he has chosen to no longer see Dr. Lizzy Arriaga (neurology). Obtained verbal order from Dr. Cruz Terrell to fill Aricept for patient.   Left message for patient to return my call to advise this medication was sent to his pharmacy and to also assist in sending any records to a new neurologist.

## 2021-06-04 NOTE — TELEPHONE ENCOUNTER
Received incoming call from Director of Patient Advocacy with [facility]. Patient presented at hospital with concerns of not having his Aricept as he has chosen to no longer see Dr. Alisia Foreman (neurology). Obtained verbal order from Dr. Ranulfo Peters to fill Aricept for patient. Left message for patient to return my call to advise this medication was sent to his pharmacy and to also assist in sending any records to a new neurologist.      Patient returned phone call. Verified patient identity with two identifiers. Spoke with patient by phone. Informed patient Dr. Ranulfo Peters has sent the prescription for his Aricept into [facility]. Asked which neurology practice he was attempting to secure an appointment with so that we could facilitate sending records over. Patient raised his voice and stated \"it doesn't even matter anymore\", Cheryl Pathak is tired of being treated as a 2nd class citizen\". Attempted to assure patient I was trying to help him, patient proceeded to scream \"no you are not\", \"send the prescription in and I am done with you\", then the call was disconnected.

## 2021-06-18 DIAGNOSIS — Z86.79 HISTORY OF ATRIAL FIBRILLATION: ICD-10-CM

## 2021-06-18 RX ORDER — METOPROLOL SUCCINATE 25 MG/1
TABLET, EXTENDED RELEASE ORAL
Qty: 90 TABLET | Refills: 1 | Status: SHIPPED | OUTPATIENT
Start: 2021-06-18

## 2021-12-28 DIAGNOSIS — Z86.79 HISTORY OF ATRIAL FIBRILLATION: ICD-10-CM

## 2021-12-28 RX ORDER — METOPROLOL SUCCINATE 25 MG/1
TABLET, EXTENDED RELEASE ORAL
Qty: 90 TABLET | Refills: 1 | OUTPATIENT
Start: 2021-12-28

## 2022-01-14 RX ORDER — TRAZODONE HYDROCHLORIDE 100 MG/1
TABLET ORAL
Qty: 90 TABLET | Refills: 2 | OUTPATIENT
Start: 2022-01-14

## 2022-01-20 DIAGNOSIS — G31.84 MILD COGNITIVE IMPAIRMENT: ICD-10-CM

## 2022-01-20 RX ORDER — DONEPEZIL HYDROCHLORIDE 10 MG/1
TABLET, FILM COATED ORAL
Qty: 90 TABLET | Refills: 0 | Status: SHIPPED | OUTPATIENT
Start: 2022-01-20

## 2022-03-18 PROBLEM — H61.893: Status: ACTIVE | Noted: 2019-05-20

## 2022-03-18 PROBLEM — R35.1 BENIGN PROSTATIC HYPERPLASIA WITH NOCTURIA: Status: ACTIVE | Noted: 2019-10-23

## 2022-03-18 PROBLEM — I95.1 ORTHOSTATIC HYPOTENSION: Status: ACTIVE | Noted: 2019-10-23

## 2022-03-18 PROBLEM — N40.1 BENIGN PROSTATIC HYPERPLASIA WITH NOCTURIA: Status: ACTIVE | Noted: 2019-10-23

## 2022-03-18 PROBLEM — N18.30 CRI (CHRONIC RENAL INSUFFICIENCY), STAGE 3 (MODERATE) (HCC): Status: ACTIVE | Noted: 2019-10-23

## 2022-03-19 PROBLEM — I10 ESSENTIAL HYPERTENSION: Status: ACTIVE | Noted: 2019-10-23

## 2022-03-19 PROBLEM — F51.01 PRIMARY INSOMNIA: Status: ACTIVE | Noted: 2019-05-20

## 2022-03-20 PROBLEM — N52.9 ERECTILE DYSFUNCTION: Status: ACTIVE | Noted: 2019-05-20

## 2022-12-08 ENCOUNTER — TRANSCRIBE ORDER (OUTPATIENT)
Dept: SCHEDULING | Age: 87
End: 2022-12-08

## 2022-12-08 DIAGNOSIS — R10.9 PAIN, ABDOMINAL: Primary | ICD-10-CM

## 2023-03-01 ENCOUNTER — DOCUMENTATION ONLY (OUTPATIENT)
Dept: INTERNAL MEDICINE CLINIC | Age: 88
End: 2023-03-01

## 2023-03-01 NOTE — PROGRESS NOTES
Patient came into the office on 02/28/23 loudly demanding to speak with Dr. Fadumo Benavidez about a problem he was having. Amalia Blank ([place]) came out to speak with Mr. Marycarmen Dale. At this time he advised her that he had blood in his urine and wanted to speak with Dr. Fadumo Benavidez about this. Mr. Marycarmen Dale had not yet establish care with a provider in our practice, but had an new patient appointment scheduled for 03/09/23. Yanick Lopez explained to Mr. Marycarmen Dale that Dr. Fadumo Benavidez was with a patient and was not able to speak with him at the moment. She also suggested that Mr. Marycarmen Dale go to urgent care or the ED where he could get the appropriate testing and treatment for the problem he is having. Mr. Marycarmen Dale became very agitated and proceeded to get in Kristina's face and scream, his body language was very threatening. At this time I came out to the lobby and stepped in between Mr. Marycarmen Dale and Yanick Lopez. I asked Mr. Marycarmen Dale to leave the office and he refused. I then told him I would call 911 if he did not leave the premises. He then said \"if I was black you would help me\" and started walking towards the exit. I opened the door for Mr. Marycarmen Dale and on his way out he got really close to my face and cursed. Mr. Marycarmen Dale vacated the premises and no further action was taken.

## 2023-05-17 RX ORDER — GUAIFENESIN 600 MG/1
600 TABLET, EXTENDED RELEASE ORAL 2 TIMES DAILY
COMMUNITY
Start: 2021-03-26

## 2023-05-17 RX ORDER — IPRATROPIUM BROMIDE 42 UG/1
2 SPRAY, METERED NASAL 4 TIMES DAILY PRN
COMMUNITY
Start: 2021-05-27

## 2023-05-17 RX ORDER — DONEPEZIL HYDROCHLORIDE 10 MG/1
1 TABLET, FILM COATED ORAL NIGHTLY
COMMUNITY
Start: 2022-01-20

## 2023-05-17 RX ORDER — MEMANTINE HYDROCHLORIDE 10 MG/1
1 TABLET ORAL 2 TIMES DAILY
COMMUNITY
Start: 2021-02-12

## 2023-05-17 RX ORDER — LISINOPRIL 2.5 MG/1
2.5 TABLET ORAL DAILY
COMMUNITY
Start: 2021-03-26

## 2023-05-17 RX ORDER — CHOLECALCIFEROL (VITAMIN D3) 125 MCG
10 CAPSULE ORAL NIGHTLY
COMMUNITY

## 2023-05-17 RX ORDER — METOPROLOL SUCCINATE 25 MG/1
1 TABLET, EXTENDED RELEASE ORAL DAILY
COMMUNITY
Start: 2021-06-18

## 2023-05-17 RX ORDER — BUSPIRONE HYDROCHLORIDE 10 MG/1
10 TABLET ORAL 3 TIMES DAILY
COMMUNITY
Start: 2021-04-05

## 2023-05-17 RX ORDER — TRAZODONE HYDROCHLORIDE 150 MG/1
150 TABLET ORAL
COMMUNITY

## 2023-05-17 RX ORDER — VENLAFAXINE 75 MG/1
75 TABLET ORAL 2 TIMES DAILY
COMMUNITY
Start: 2021-04-05

## 2023-05-17 RX ORDER — THIAMINE HCL 100 MG
TABLET ORAL 2 TIMES DAILY
COMMUNITY

## 2023-08-01 ENCOUNTER — TELEPHONE (OUTPATIENT)
Age: 88
End: 2023-08-01

## 2023-08-01 NOTE — TELEPHONE ENCOUNTER
Dr Christine Gonsalez @ [phone], would like pt to see ep doc for pacemaker. Pt use to see [facility], pt is frustrated and left last card office because he was told he needs an pacemaker. Pt has spoke w/ Dr Aby Mendoza and understand that he needs pacer but refused to go back to [facility]. Pls call pt on [phone] to schedule appt for new pt visit.  Thanks

## 2023-08-03 ENCOUNTER — TELEPHONE (OUTPATIENT)
Age: 88
End: 2023-08-03

## 2023-10-11 NOTE — PROGRESS NOTES
guaiFENesin (MUCINEX) 600 MG extended release tablet Take 1 tablet by mouth 2 times daily      ipratropium (ATROVENT) 0.06 % nasal spray 2 sprays by Nasal route 4 times daily as needed      lisinopril (PRINIVIL;ZESTRIL) 2.5 MG tablet Take 1 tablet by mouth daily      memantine (NAMENDA) 10 MG tablet Take 1 tablet by mouth 2 times daily      traZODone (DESYREL) 150 MG tablet Take 1 tablet by mouth      venlafaxine (EFFEXOR) 75 MG tablet Take 1 tablet by mouth 2 times daily      melatonin 5 MG TABS tablet Take 2 tablets by mouth nightly (Patient not taking: Reported on 10/12/2023)      metoprolol succinate (TOPROL XL) 25 MG extended release tablet Take 1 tablet by mouth daily (Patient not taking: Reported on 10/12/2023)       No current facility-administered medications for this visit. No Known Allergies  Past Medical History:   Diagnosis Date    Calculus of kidney >20 years    Chronic kidney disease          Chronic obstructive pulmonary disease ([address])     Removed     CRI (chronic renal insufficiency), stage 3 (moderate) ([address]) 10/23/2019    Depression     Essential hypertension 10/23/2019    Hypertension >30 years    Kidney disease      Past Surgical History:   Procedure Laterality Date    CHOLECYSTECTOMY      COLONOSCOPY  2018    LITHOTRIPSY      PROSTATECTOMY       Family History   Problem Relation Age of Onset    Prostate Cancer Father     Stroke Mother     Hypertension Mother         cerebral hemmorage     Cancer Father         Prostate Cancer    Suicide Brother     Suicide Daughter      Social History     Tobacco Use    Smoking status: Former     Packs/day: 1     Types: Cigarettes     Quit date: 1990     Years since quittin.8    Smokeless tobacco: Never   Substance Use Topics    Alcohol use: Yes     Alcohol/week: 0.0 standard drinks of alcohol        Review of Systems:   12 point review of systems was performed.  All negative except for HPI     Objective:   /70 (Site: Left Upper Arm,

## 2023-10-12 ENCOUNTER — OFFICE VISIT (OUTPATIENT)
Age: 88
End: 2023-10-12

## 2023-10-12 VITALS
SYSTOLIC BLOOD PRESSURE: 108 MMHG | HEART RATE: 77 BPM | DIASTOLIC BLOOD PRESSURE: 70 MMHG | HEIGHT: 70 IN | WEIGHT: 184.8 LBS | BODY MASS INDEX: 26.45 KG/M2 | OXYGEN SATURATION: 96 %

## 2023-10-12 DIAGNOSIS — Z79.01 ANTICOAGULATION ADEQUATE: ICD-10-CM

## 2023-10-12 DIAGNOSIS — I10 ESSENTIAL HYPERTENSION: ICD-10-CM

## 2023-10-12 DIAGNOSIS — I48.19 PERSISTENT ATRIAL FIBRILLATION (HCC): ICD-10-CM

## 2023-10-12 DIAGNOSIS — I48.19 PERSISTENT ATRIAL FIBRILLATION (HCC): Primary | ICD-10-CM

## 2023-10-12 DIAGNOSIS — N18.30 CRI (CHRONIC RENAL INSUFFICIENCY), STAGE 3 (MODERATE) (HCC): ICD-10-CM

## 2023-10-12 PROCEDURE — 93010 ELECTROCARDIOGRAM REPORT: CPT | Performed by: INTERNAL MEDICINE

## 2023-10-12 PROCEDURE — 99205 OFFICE O/P NEW HI 60 MIN: CPT | Performed by: INTERNAL MEDICINE

## 2023-10-12 PROCEDURE — 93005 ELECTROCARDIOGRAM TRACING: CPT | Performed by: INTERNAL MEDICINE

## 2023-10-12 PROCEDURE — 1123F ACP DISCUSS/DSCN MKR DOCD: CPT | Performed by: INTERNAL MEDICINE

## 2023-10-12 RX ORDER — METOPROLOL SUCCINATE 25 MG/1
25 TABLET, EXTENDED RELEASE ORAL DAILY
Qty: 90 TABLET | Refills: 1 | Status: SHIPPED | OUTPATIENT
Start: 2023-10-12

## 2023-10-12 ASSESSMENT — PATIENT HEALTH QUESTIONNAIRE - PHQ9
SUM OF ALL RESPONSES TO PHQ QUESTIONS 1-9: 0
SUM OF ALL RESPONSES TO PHQ9 QUESTIONS 1 & 2: 0
1. LITTLE INTEREST OR PLEASURE IN DOING THINGS: 0
SUM OF ALL RESPONSES TO PHQ QUESTIONS 1-9: 0
2. FEELING DOWN, DEPRESSED OR HOPELESS: 0
SUM OF ALL RESPONSES TO PHQ QUESTIONS 1-9: 0
SUM OF ALL RESPONSES TO PHQ QUESTIONS 1-9: 0

## 2023-10-12 NOTE — PATIENT INSTRUCTIONS
Schedule for ROSAS/cardioversion next available  We will obtain records from Dr. Kylee Muller office regarding recent event monitor  FU in 2 months with NP   FU with Dr. Sheri Landau in 1 year      Your TRANSESOPHAGEAL ECHOCARDIOGRAM (ROSAS)/CARDIOVERSION procedure has been scheduled for 10/18/23 at 1230 pm, at [facility].    Please report to Admitting Department by 1100 am, or 1.5 hours prior to your scheduled procedure. Please bring a list of your current medications and medication bottles, if able, to the hospital on this day. You will be unable to drive after your procedure so please make sure to bring someone with you to your procedure. You will need to have nothing to eat or drink after midnight, the night prior to your procedure. You may have small sips of water, if needed, to take with your medication. You will need labs drawn prior to your procedure. Please go to have this done [facility]. You should NOT stop your medications prior to your scheduled procedure. After your procedure, you will need to follow up with Dr. Ishaan Laird nurse practitioner Ren Daniels.  Your follow-up appointment has been scheduled for 12/15/23 at 1040 AM.

## 2023-10-13 ENCOUNTER — PREP FOR PROCEDURE (OUTPATIENT)
Age: 88
End: 2023-10-13

## 2023-10-13 LAB
ANION GAP SERPL CALC-SCNC: 4 MMOL/L (ref 5–15)
BUN SERPL-MCNC: 46 MG/DL (ref 6–20)
BUN/CREAT SERPL: 23 (ref 12–20)
CALCIUM SERPL-MCNC: 10 MG/DL (ref 8.5–10.1)
CHLORIDE SERPL-SCNC: 108 MMOL/L (ref 97–108)
CO2 SERPL-SCNC: 28 MMOL/L (ref 21–32)
CREAT SERPL-MCNC: 2.03 MG/DL (ref 0.7–1.3)
GLUCOSE SERPL-MCNC: 123 MG/DL (ref 65–100)
MAGNESIUM SERPL-MCNC: 2.4 MG/DL (ref 1.6–2.4)
POTASSIUM SERPL-SCNC: 4.2 MMOL/L (ref 3.5–5.1)
SODIUM SERPL-SCNC: 140 MMOL/L (ref 136–145)

## 2023-10-13 RX ORDER — SODIUM CHLORIDE 9 MG/ML
INJECTION, SOLUTION INTRAVENOUS PRN
Status: CANCELLED | OUTPATIENT
Start: 2023-10-13

## 2023-10-16 ENCOUNTER — PATIENT MESSAGE (OUTPATIENT)
Age: 88
End: 2023-10-16

## 2023-10-18 ENCOUNTER — ANESTHESIA (OUTPATIENT)
Facility: HOSPITAL | Age: 88
End: 2023-10-18
Payer: MEDICARE

## 2023-10-18 ENCOUNTER — HOSPITAL ENCOUNTER (OUTPATIENT)
Facility: HOSPITAL | Age: 88
Discharge: HOME | End: 2023-10-20
Attending: SPECIALIST
Payer: MEDICARE

## 2023-10-18 ENCOUNTER — ANESTHESIA EVENT (OUTPATIENT)
Facility: HOSPITAL | Age: 88
End: 2023-10-18
Payer: MEDICARE

## 2023-10-18 VITALS
SYSTOLIC BLOOD PRESSURE: 107 MMHG | WEIGHT: 184 LBS | OXYGEN SATURATION: 97 % | HEART RATE: 82 BPM | RESPIRATION RATE: 20 BRPM | DIASTOLIC BLOOD PRESSURE: 71 MMHG | BODY MASS INDEX: 26.34 KG/M2 | HEIGHT: 70 IN

## 2023-10-18 DIAGNOSIS — I48.19 ATRIAL FIBRILLATION, PERSISTENT (HCC): ICD-10-CM

## 2023-10-18 LAB — ECHO BSA: 2.03 M2

## 2023-10-18 PROCEDURE — 6370000000 HC RX 637 (ALT 250 FOR IP): Performed by: SPECIALIST

## 2023-10-18 PROCEDURE — 2580000003 HC RX 258: Performed by: NURSE ANESTHETIST, CERTIFIED REGISTERED

## 2023-10-18 PROCEDURE — 3700000000 HC ANESTHESIA ATTENDED CARE

## 2023-10-18 PROCEDURE — 6360000002 HC RX W HCPCS: Performed by: NURSE ANESTHETIST, CERTIFIED REGISTERED

## 2023-10-18 PROCEDURE — 3700000001 HC ADD 15 MINUTES (ANESTHESIA)

## 2023-10-18 PROCEDURE — 93312 ECHO TRANSESOPHAGEAL: CPT

## 2023-10-18 PROCEDURE — 2500000003 HC RX 250 WO HCPCS: Performed by: NURSE ANESTHETIST, CERTIFIED REGISTERED

## 2023-10-18 RX ORDER — EPHEDRINE SULFATE 50 MG/ML
INJECTION INTRAVENOUS
Status: DISPENSED
Start: 2023-10-18 | End: 2023-10-19

## 2023-10-18 RX ORDER — EPHEDRINE SULFATE/0.9% NACL/PF 50 MG/5 ML
SYRINGE (ML) INTRAVENOUS PRN
Status: DISCONTINUED | OUTPATIENT
Start: 2023-10-18 | End: 2023-10-18 | Stop reason: SDUPTHER

## 2023-10-18 RX ORDER — LIDOCAINE HYDROCHLORIDE 20 MG/ML
INJECTION, SOLUTION EPIDURAL; INFILTRATION; INTRACAUDAL; PERINEURAL PRN
Status: DISCONTINUED | OUTPATIENT
Start: 2023-10-18 | End: 2023-10-18 | Stop reason: SDUPTHER

## 2023-10-18 RX ORDER — PHENYLEPHRINE HCL IN 0.9% NACL 0.4MG/10ML
SYRINGE (ML) INTRAVENOUS
Status: DISPENSED
Start: 2023-10-18 | End: 2023-10-19

## 2023-10-18 RX ORDER — ESCITALOPRAM OXALATE 10 MG/1
10 TABLET ORAL DAILY
COMMUNITY

## 2023-10-18 RX ORDER — FINASTERIDE 5 MG/1
5 TABLET, FILM COATED ORAL DAILY
COMMUNITY

## 2023-10-18 RX ORDER — TAMSULOSIN HYDROCHLORIDE 0.4 MG/1
0.4 CAPSULE ORAL DAILY
COMMUNITY

## 2023-10-18 RX ORDER — SODIUM CHLORIDE 9 MG/ML
INJECTION, SOLUTION INTRAVENOUS CONTINUOUS PRN
Status: DISCONTINUED | OUTPATIENT
Start: 2023-10-18 | End: 2023-10-18 | Stop reason: SDUPTHER

## 2023-10-18 RX ADMIN — PHENYLEPHRINE HYDROCHLORIDE 120 MCG: 10 INJECTION INTRAVENOUS at 12:39

## 2023-10-18 RX ADMIN — PROPOFOL 25 MG: 10 INJECTION, EMULSION INTRAVENOUS at 12:41

## 2023-10-18 RX ADMIN — PHENYLEPHRINE HYDROCHLORIDE 40 MCG: 10 INJECTION INTRAVENOUS at 12:50

## 2023-10-18 RX ADMIN — PHENYLEPHRINE HYDROCHLORIDE 120 MCG: 10 INJECTION INTRAVENOUS at 12:46

## 2023-10-18 RX ADMIN — PHENYLEPHRINE HYDROCHLORIDE 200 MCG: 10 INJECTION INTRAVENOUS at 12:55

## 2023-10-18 RX ADMIN — PROPOFOL 75 MG: 10 INJECTION, EMULSION INTRAVENOUS at 12:35

## 2023-10-18 RX ADMIN — PROPOFOL 25 MG: 10 INJECTION, EMULSION INTRAVENOUS at 12:44

## 2023-10-18 RX ADMIN — SODIUM CHLORIDE: 9 INJECTION, SOLUTION INTRAVENOUS at 12:20

## 2023-10-18 RX ADMIN — PHENYLEPHRINE HYDROCHLORIDE 120 MCG: 10 INJECTION INTRAVENOUS at 12:43

## 2023-10-18 RX ADMIN — Medication 25 MG: at 12:59

## 2023-10-18 RX ADMIN — APIXABAN 2.5 MG: 2.5 TABLET, FILM COATED ORAL at 13:27

## 2023-10-18 RX ADMIN — PROPOFOL 25 MG: 10 INJECTION, EMULSION INTRAVENOUS at 12:48

## 2023-10-18 RX ADMIN — PROPOFOL 25 MG: 10 INJECTION, EMULSION INTRAVENOUS at 12:38

## 2023-10-18 RX ADMIN — LIDOCAINE HYDROCHLORIDE 100 MG: 20 INJECTION, SOLUTION EPIDURAL; INFILTRATION; INTRACAUDAL; PERINEURAL at 12:35

## 2023-10-18 NOTE — PROGRESS NOTES
Pt arrives ambulatory to noninvasive cardiology department accompanied by daughter for ROSAS/DCC procedure. All assessments completed and consent was reviewed. Education given was regarding procedure, sedation medications to be given, potential side effects of medications to be given, post-procedure management and follow-up. Opportunity for questions was provided and all questions and concerns were addressed. Patient and patient contact verbalized understanding of education.

## 2023-10-18 NOTE — PROGRESS NOTES
Patient has returned to baseline at arrival for procedure. Discussed AVS and discharge instructions with patient and provided a copy take home. Pt voiced understanding and denied any questions or need for clarification. IV D/C'd and hemostasis attained. Coban and gauze dressing applied. Transported with:  Nurse via W/C to vehicle driven by daughter. Was the patient seen in the last year in this department? Yes     Does patient have an active prescription for medications requested? No     Received Request Via: Pharmacy

## 2023-10-18 NOTE — PROCEDURES
Andrei Riojas is a 81 y.o. male patient. 1. Atrial fibrillation, persistent ([address])      Past Medical History:   Diagnosis Date    Calculus of kidney >20 years    Chronic kidney disease      1995    Chronic obstructive pulmonary disease ([address]) 1995    Removed 2019    CRI (chronic renal insufficiency), stage 3 (moderate) ([address]) 10/23/2019    Depression     Essential hypertension 10/23/2019    Hypertension >30 years    Kidney disease      Blood pressure 124/81, pulse 79, resp. rate 23, height 1.778 m (5' 10\"), weight 83.5 kg (184 lb), SpO2 98 %. Procedures    Patient with persistent atrial fibrillation. Evaluated by EP and plan to proceed today with ROSAS cardioversion. ROSAS: Normal a ventricular systolic function visually estimated 55 to 60%. Trace to mild mitral regurgitation and tricuspid regurgitation. No evidence for aortic stenosis. No convincing evidence for left atrial appendage thrombus in different views. Cardioversion: Cardioversion performed in a synchronized fashion. 150 J were delivered with restoration of normal sinus rhythm although mostly sinus bradycardia. No immediate postop complications noted.     Misa Cabrera MD  10/18/2023

## 2023-10-18 NOTE — ANESTHESIA POSTPROCEDURE EVALUATION
Department of Anesthesiology  Postprocedure Note    Patient: Giovana Kelley  MRN: [id number]  YOB: 1935  Date of evaluation: 10/18/2023      Procedure Summary     Date: 10/18/23 Room / Location: [address] [place] [address] NON-INVASIVE CARDIOLOGY    Anesthesia Start: 7206 Anesthesia Stop: 1301    Procedure: ROSAS W/ POSSIBLE CARDIOVERSION (PRN CONTRAST/BUBBLE/3D) Diagnosis: Atrial fibrillation, persistent ([address])    Scheduled Providers: Marquis Zayas MD; Mariluz Lopez MD Responsible Provider: Mariluz Lopez MD    Anesthesia Type: MAC ASA Status: 3          Anesthesia Type: No value filed.     Miranda Phase I: Miranda Score: 10    Miranda Phase II:        Anesthesia Post Evaluation    Patient location during evaluation: PACU  Patient participation: complete - patient participated  Level of consciousness: awake  Pain score: 2  Airway patency: patent  Nausea & Vomiting: no nausea  Complications: no  Cardiovascular status: blood pressure returned to baseline  Respiratory status: acceptable  Hydration status: euvolemic  Pain management: adequate

## 2023-10-18 NOTE — PROCEDURES
Veronika Chicas is a 81 y.o. male patient. 1. Atrial fibrillation, persistent ([address])      Past Medical History:   Diagnosis Date    Calculus of kidney >20 years    Chronic kidney disease      1995    Chronic obstructive pulmonary disease ([address]) 1995    Removed 2019    CRI (chronic renal insufficiency), stage 3 (moderate) ([address]) 10/23/2019    Depression     Essential hypertension 10/23/2019    Hypertension >30 years    Kidney disease      Blood pressure 124/81, pulse 79, resp. rate 23, height 1.778 m (5' 10\"), weight 83.5 kg (184 lb), SpO2 98 %. Patient is here today for ROSAS cardioversion. Evaluated by Dr. Beatriz Gastelum in account of persistent atrial fibrillation and was asked to proceed with ROSAS cardioversion by Dr. Beatriz Gastelum after patient was evaluated in office. I have discussed again with the patient risks and benefits of ROSAS cardioversion. Risks including but not limited to esophageal perforation bleeding sore throat respiratory depression require intubation and more specifically of the cardioversion potential for CVA emergency pacemaker placement death and cutaneous burns        The patient understood the ROSAS and cardioversion procedure all questions were answered and he is agreeable to proceed as planned. Currently on Eliquis and Toprol-XL 25 mg daily.       aKz Johnson MD  10/18/2023

## 2023-11-19 ENCOUNTER — OFFICE VISIT (OUTPATIENT)
Facility: HOSPITAL | Age: 88
End: 2023-11-19
Attending: EMERGENCY MEDICINE
Payer: MEDICARE

## 2023-11-19 ENCOUNTER — HOSPITAL ENCOUNTER (EMERGENCY)
Facility: HOSPITAL | Age: 88
Discharge: LEFT BEFORE DISCHARGE | End: 2023-11-19
Attending: EMERGENCY MEDICINE
Payer: MEDICARE

## 2023-11-19 VITALS
RESPIRATION RATE: 16 BRPM | OXYGEN SATURATION: 97 % | SYSTOLIC BLOOD PRESSURE: 132 MMHG | HEART RATE: 72 BPM | DIASTOLIC BLOOD PRESSURE: 83 MMHG | TEMPERATURE: 98.4 F

## 2023-11-19 DIAGNOSIS — R33.8 ACUTE URINARY RETENTION: Primary | ICD-10-CM

## 2023-11-19 PROCEDURE — 51702 INSERT TEMP BLADDER CATH: CPT

## 2023-11-19 PROCEDURE — 99284 EMERGENCY DEPT VISIT MOD MDM: CPT

## 2023-11-19 ASSESSMENT — PAIN - FUNCTIONAL ASSESSMENT: PAIN_FUNCTIONAL_ASSESSMENT: NONE - DENIES PAIN

## 2023-11-19 NOTE — ED PROVIDER NOTES
[facility] EMERGENCY DEP  EMERGENCY DEPARTMENT ENCOUNTER      Pt Name: Lewis Lin  MRN: [id number]  [address] 1935  Date of evaluation: 11/19/2023  Provider: Tyler Villalpando MD    CHIEF COMPLAINT       Chief Complaint   Patient presents with    Urinary Retention         HISTORY OF PRESENT ILLNESS    67-year-old male presents with difficulty urinating. He is still able to produce urine but only in small amounts and he has a sense of fullness without acute pain. No fevers. Review of External Medical Records:     Nursing Notes were reviewed. REVIEW OF SYSTEMS       Review of Systems    Except as noted above the remainder of the review of systems was reviewed and negative.        PAST MEDICAL HISTORY     Past Medical History:   Diagnosis Date    Calculus of kidney >20 years    Chronic kidney disease      1995    Chronic obstructive pulmonary disease ([address]) 1995    Removed 2019    CRI (chronic renal insufficiency), stage 3 (moderate) ([address]) 10/23/2019    Depression     Essential hypertension 10/23/2019    Hypertension >30 years    Kidney disease          SURGICAL HISTORY       Past Surgical History:   Procedure Laterality Date    CHOLECYSTECTOMY      COLONOSCOPY  2018    LITHOTRIPSY      PROSTATECTOMY           CURRENT MEDICATIONS       Previous Medications    APIXABAN (ELIQUIS) 2.5 MG TABS TABLET    Take 1 tablet by mouth 2 times daily    BUSPIRONE (BUSPAR) 10 MG TABLET    Take 1 tablet by mouth 3 times daily    CALCIUM CITRATE-VITAMIN D3 315-6.25 MG-MCG TABS    Take by mouth 2 times daily    DONEPEZIL (ARICEPT) 10 MG TABLET    Take 1 tablet by mouth nightly    ESCITALOPRAM (LEXAPRO) 10 MG TABLET    Take 1 tablet by mouth daily    FINASTERIDE (PROSCAR) 5 MG TABLET    Take 1 tablet by mouth daily    LISINOPRIL (PRINIVIL;ZESTRIL) 2.5 MG TABLET    Take 1 tablet by mouth daily    MEMANTINE (NAMENDA) 10 MG TABLET    Take 1 tablet by mouth 2 times daily    METOPROLOL SUCCINATE (TOPROL XL) 25 MG EXTENDED

## 2023-11-19 NOTE — ED NOTES
Went into pt room to place mcintosh in patient. As I was explaining what we were about to do, the pt begins yelling at me statin that it is ridiculous that we are not admitting him for this problem. I explained that we were placin the mcintosh so that we could relieve his urinary retention and that he would need follow up with urology. Then he states \"I dont want follow up. I came here to be fixed. You are not fixing my problem. What kind of place is this? \" I asked pt if he could stop yelling and he states \"\"I always yell. This is how I like to talk\". Pt then requesting another nurse to place the mcintosh. I was getting another nurse to place the mcintosh when the pt eloped. Provider aware.       Jake Armstrong RN  11/19/23 0635

## 2023-11-19 NOTE — ED NOTES
Patient eloped from department after screaming at nursing staff     Enriqueta Gilman RN  11/19/23 4559

## 2023-11-19 NOTE — ED TRIAGE NOTES
He reports unable to pass his urine adequately for a week. He says he is going \"just a little\". Seen by Dr. Violeta Crowell in triage. Bladder scanned by Dr. Violeta Crowell.

## 2023-12-10 NOTE — PROGRESS NOTES
[facility]  Cardiac Electrophysiology Clinic Care Note                  []Initial visit     [x]Established visit     Patient Name: Addison Vega - WRE:[birth year] - R:[id number]  Primary Cardiologist: Chris Rodriguez MD  Electrophysiologist: Jackie Crouch MD     Reason for visit: Persistent AF follow up    HPI:  Mr. Alverto Garber is a 81 y.o. male who presents for follow up, is s/p ROSAS/DCCV of persistent AF on 10/18/2023. Previously prescribed Toprol XL, but he's unsure whether he's been taking it. He reports doing well, denies chest pain, palpitations, SOB, PND, orthopnea, syncope, or edema. ECG today shows sinus bradycardia 48 bpm with frequent PACs. ROSAS in 10/2023 showed LVEF 55-60% with mild MR & TR.    BP controlled. Anticoagulated with low dose Eliquis (age, Cr). Previous:  S/p ROSAS/DCCV 10/18/2023. AF burden 2% per event monitor in 2020. Assessment and Plan     Persistent AF:  -[place] 2% per event monitor in 2020.  -No known recurrence. -Believes he hasn't been taking Toprol XL, but will check at home & let us know if he has been. -ECG today shows sinus bradycardia 48 bpm with frequent PACs. -Should he have significant bradycardia in the future, would recommend pacemaker. Sinus bradycardia:  -ECG today shows sinus bradycardia 48 bpm with frequent PACs. -Asymptomatic. No indication for pacemaker at this point.  -If it turns out he has been taking Toprol XL, would recommend decreasing dose to 12.5 mg po daily. HTN:   -BP controlled. -Continue current medication regimen. Anticoagulation:  -Continue low dose Eliquis (age, Cr) for thromboembolic prophylaxis. -Denies bleeding issues. -Knows to contact clinic for BRBPR, melena, hematuria, or hemoptysis. Follow up in EP clinic as noted below.     Future Appointments   Date Time Provider [address]   10/10/2024  1:40 PM Jackie Rice MD [facility]

## 2023-12-15 ENCOUNTER — OFFICE VISIT (OUTPATIENT)
Age: 88
End: 2023-12-15
Payer: MEDICARE

## 2023-12-15 ENCOUNTER — TELEPHONE (OUTPATIENT)
Age: 88
End: 2023-12-15

## 2023-12-15 VITALS
SYSTOLIC BLOOD PRESSURE: 128 MMHG | HEIGHT: 70 IN | OXYGEN SATURATION: 96 % | HEART RATE: 63 BPM | WEIGHT: 188 LBS | BODY MASS INDEX: 26.92 KG/M2 | DIASTOLIC BLOOD PRESSURE: 64 MMHG

## 2023-12-15 DIAGNOSIS — I49.1 PAC (PREMATURE ATRIAL CONTRACTION): ICD-10-CM

## 2023-12-15 DIAGNOSIS — R00.1 SINUS BRADYCARDIA: ICD-10-CM

## 2023-12-15 DIAGNOSIS — Z79.01 ANTICOAGULATED: ICD-10-CM

## 2023-12-15 DIAGNOSIS — I10 PRIMARY HYPERTENSION: ICD-10-CM

## 2023-12-15 DIAGNOSIS — I48.19 PERSISTENT ATRIAL FIBRILLATION (HCC): Primary | ICD-10-CM

## 2023-12-15 PROCEDURE — 93005 ELECTROCARDIOGRAM TRACING: CPT | Performed by: NURSE PRACTITIONER

## 2023-12-15 PROCEDURE — 99214 OFFICE O/P EST MOD 30 MIN: CPT | Performed by: NURSE PRACTITIONER

## 2023-12-15 RX ORDER — METOPROLOL SUCCINATE 25 MG/1
12.5 TABLET, EXTENDED RELEASE ORAL DAILY
Qty: 45 TABLET | Refills: 3 | Status: SHIPPED | OUTPATIENT
Start: 2023-12-15

## 2023-12-15 ASSESSMENT — PATIENT HEALTH QUESTIONNAIRE - PHQ9
SUM OF ALL RESPONSES TO PHQ9 QUESTIONS 1 & 2: 1
1. LITTLE INTEREST OR PLEASURE IN DOING THINGS: 0
SUM OF ALL RESPONSES TO PHQ QUESTIONS 1-9: 1
2. FEELING DOWN, DEPRESSED OR HOPELESS: 1

## 2023-12-15 NOTE — TELEPHONE ENCOUNTER
RHONA Benitez - NP  You; Bertha Chapman RN2 hours ago (12:22 PM)       Please advise patient to decrease dose to 12.5 mg po daily due to bradycardia. He has frequent PACs, so I think he could still benefit for having it on board in some capacity. Called patient, ID verified using two patient identifiers. Informed patient of above medication changes per SOTO Acosta NP. Opportunities for questions, clarifications, and concerns provided. Patient expressed understanding of all information. Med list updated.

## 2023-12-15 NOTE — TELEPHONE ENCOUNTER
Please advise patient to decrease dose to 12.5 mg po daily due to bradycardia. He has frequent PACs, so I think he could still benefit for having it on board in some capacity.

## 2024-01-01 ENCOUNTER — TELEPHONE (OUTPATIENT)
Age: 89
End: 2024-01-01

## 2024-06-12 NOTE — PROGRESS NOTES
[facility] Cardiology  Office Note     Patient Name: Florencio Choi - :1935 - MRN:[id number]  Primary Cardiologist: Asa Francois MD  Electrophysiologist: Jackie Rice MD     Reason for visit: Afib follow up    HPI:  Mr. Choi is a 89 y.o. male who presents for follow up, s/p ROSAS/DCCV of persistent AF on 10/18/2023. ECG today shows atrial flutter with  bpm and PVCs with inferior Q waves.  He is only taking eliquis.  He stopped all of his other medications and quit his PCP due to a disagreement.  He reports doing well, denies chest pain, palpitations, SOB, PND, orthopnea, or edema.  ROSAS in 10/2023 showed LVEF 55-60% with mild MR & TR.  BP controlled.  Anticoagulated with low dose Eliquis (age, Cr). Denies any history of bleeding or excessive bruising.  Today he c/o dysuria, believes he may have a UTI.    Previous:  S/p ROSAS/DCCV 10/18/2023.  AF burden 2% per event monitor in .     Assessment and Plan     Persistent AF/flutter:  -[place] 2% per event monitor in .  -Back in AFL now, advised him to begin Toprol XL 25mg in the evening  -Will continue eliquis 2.5mg BID for OAC  -He will follow up with me again in 1 month for repeat ECG   -Has follow up with Dr. Rice scheduled for October    Sinus bradycardia:  -ECG today AFL with RVR  -No indication for pacemaker at this point.    HTN:   -BP controlled without medications    Anticoagulation:  -Continue low dose Eliquis (age, Cr) for thromboembolic prophylaxis.  -Denies bleeding issues.  -Knows to contact clinic for BRBPR, melena, hematuria, or hemoptysis.    CKD stage 3b  Creatinine 2.0 in 10/23  Will check CMP today      Dysuria   Will check UA with reflex culture today        Future Appointments   Date Time Provider Department Center   2024  2:40 PM Tana Childs APRN - [facility]   10/24/2024  2:00 PM Jackie Rice MD [facility]     Cardiac testing    10/18/23    ROSAS W/ POSSIBLE CARDIOVERSION (PRN CONTRAST/BUBBLE/3D)

## 2024-06-13 ENCOUNTER — OFFICE VISIT (OUTPATIENT)
Age: 89
End: 2024-06-13
Payer: MEDICARE

## 2024-06-13 VITALS
SYSTOLIC BLOOD PRESSURE: 130 MMHG | DIASTOLIC BLOOD PRESSURE: 74 MMHG | HEART RATE: 90 BPM | WEIGHT: 187.2 LBS | BODY MASS INDEX: 26.8 KG/M2 | HEIGHT: 70 IN | OXYGEN SATURATION: 97 %

## 2024-06-13 DIAGNOSIS — I48.0 PAROXYSMAL ATRIAL FIBRILLATION (HCC): ICD-10-CM

## 2024-06-13 DIAGNOSIS — I10 PRIMARY HYPERTENSION: ICD-10-CM

## 2024-06-13 DIAGNOSIS — I48.0 PAROXYSMAL ATRIAL FIBRILLATION (HCC): Primary | ICD-10-CM

## 2024-06-13 DIAGNOSIS — R30.0 DYSURIA: ICD-10-CM

## 2024-06-13 DIAGNOSIS — R00.1 SINUS BRADYCARDIA: ICD-10-CM

## 2024-06-13 DIAGNOSIS — N18.32 CKD STAGE 3B, GFR 30-44 ML/MIN (HCC): ICD-10-CM

## 2024-06-13 LAB
ALBUMIN SERPL-MCNC: 3.4 G/DL (ref 3.5–5)
ALBUMIN/GLOB SERPL: 0.9 (ref 1.1–2.2)
ALP SERPL-CCNC: 66 U/L (ref 45–117)
ALT SERPL-CCNC: 23 U/L (ref 12–78)
ANION GAP SERPL CALC-SCNC: 4 MMOL/L (ref 5–15)
APPEARANCE UR: ABNORMAL
AST SERPL-CCNC: 15 U/L (ref 15–37)
BACTERIA URNS QL MICRO: ABNORMAL /HPF
BILIRUB SERPL-MCNC: 0.5 MG/DL (ref 0.2–1)
BILIRUB UR QL: NEGATIVE
BUN SERPL-MCNC: 52 MG/DL (ref 6–20)
BUN/CREAT SERPL: 26 (ref 12–20)
CALCIUM SERPL-MCNC: 10.1 MG/DL (ref 8.5–10.1)
CHLORIDE SERPL-SCNC: 113 MMOL/L (ref 97–108)
CO2 SERPL-SCNC: 28 MMOL/L (ref 21–32)
COLOR UR: ABNORMAL
CREAT SERPL-MCNC: 1.97 MG/DL (ref 0.7–1.3)
EPITH CASTS URNS QL MICRO: ABNORMAL /LPF
ERYTHROCYTE [DISTWIDTH] IN BLOOD BY AUTOMATED COUNT: 13 % (ref 11.5–14.5)
GLOBULIN SER CALC-MCNC: 3.6 G/DL (ref 2–4)
GLUCOSE SERPL-MCNC: 150 MG/DL (ref 65–100)
GLUCOSE UR STRIP.AUTO-MCNC: NEGATIVE MG/DL
HCT VFR BLD AUTO: 46.9 % (ref 36.6–50.3)
HGB BLD-MCNC: 15.2 G/DL (ref 12.1–17)
HGB UR QL STRIP: ABNORMAL
KETONES UR QL STRIP.AUTO: NEGATIVE MG/DL
LEUKOCYTE ESTERASE UR QL STRIP.AUTO: ABNORMAL
MAGNESIUM SERPL-MCNC: 2.5 MG/DL (ref 1.6–2.4)
MCH RBC QN AUTO: 29.2 PG (ref 26–34)
MCHC RBC AUTO-ENTMCNC: 32.4 G/DL (ref 30–36.5)
MCV RBC AUTO: 90.2 FL (ref 80–99)
NITRITE UR QL STRIP.AUTO: NEGATIVE
NRBC # BLD: 0 K/UL (ref 0–0.01)
NRBC BLD-RTO: 0 PER 100 WBC
PH UR STRIP: 5.5 (ref 5–8)
PLATELET # BLD AUTO: 145 K/UL (ref 150–400)
PMV BLD AUTO: 10.8 FL (ref 8.9–12.9)
POTASSIUM SERPL-SCNC: 4.3 MMOL/L (ref 3.5–5.1)
PROT SERPL-MCNC: 7 G/DL (ref 6.4–8.2)
PROT UR STRIP-MCNC: 30 MG/DL
RBC # BLD AUTO: 5.2 M/UL (ref 4.1–5.7)
RBC #/AREA URNS HPF: ABNORMAL /HPF (ref 0–5)
SODIUM SERPL-SCNC: 145 MMOL/L (ref 136–145)
SP GR UR REFRACTOMETRY: 1.01 (ref 1–1.03)
URINE CULTURE IF INDICATED: ABNORMAL
UROBILINOGEN UR QL STRIP.AUTO: 0.2 EU/DL (ref 0.2–1)
WBC # BLD AUTO: 8.5 K/UL (ref 4.1–11.1)
WBC URNS QL MICRO: >100 /HPF (ref 0–4)

## 2024-06-13 PROCEDURE — G8419 CALC BMI OUT NRM PARAM NOF/U: HCPCS | Performed by: NURSE PRACTITIONER

## 2024-06-13 PROCEDURE — G8427 DOCREV CUR MEDS BY ELIG CLIN: HCPCS | Performed by: NURSE PRACTITIONER

## 2024-06-13 PROCEDURE — 1123F ACP DISCUSS/DSCN MKR DOCD: CPT | Performed by: NURSE PRACTITIONER

## 2024-06-13 PROCEDURE — 93005 ELECTROCARDIOGRAM TRACING: CPT | Performed by: NURSE PRACTITIONER

## 2024-06-13 PROCEDURE — 99214 OFFICE O/P EST MOD 30 MIN: CPT | Performed by: NURSE PRACTITIONER

## 2024-06-13 PROCEDURE — 1036F TOBACCO NON-USER: CPT | Performed by: NURSE PRACTITIONER

## 2024-06-13 PROCEDURE — 93010 ELECTROCARDIOGRAM REPORT: CPT | Performed by: NURSE PRACTITIONER

## 2024-06-13 RX ORDER — METOPROLOL SUCCINATE 25 MG/1
25 TABLET, EXTENDED RELEASE ORAL EVERY EVENING
Qty: 90 TABLET | Refills: 3 | Status: SHIPPED | OUTPATIENT
Start: 2024-06-13

## 2024-06-13 ASSESSMENT — PATIENT HEALTH QUESTIONNAIRE - PHQ9
SUM OF ALL RESPONSES TO PHQ QUESTIONS 1-9: 0
SUM OF ALL RESPONSES TO PHQ QUESTIONS 1-9: 0
2. FEELING DOWN, DEPRESSED OR HOPELESS: NOT AT ALL
1. LITTLE INTEREST OR PLEASURE IN DOING THINGS: NOT AT ALL
SUM OF ALL RESPONSES TO PHQ9 QUESTIONS 1 & 2: 0
SUM OF ALL RESPONSES TO PHQ QUESTIONS 1-9: 0
SUM OF ALL RESPONSES TO PHQ QUESTIONS 1-9: 0

## 2024-06-13 NOTE — PATIENT INSTRUCTIONS
Please begin Toprol XL 25mg in the evening to control your heart rate in Afib  Continue eliquis

## 2024-06-14 ENCOUNTER — TELEPHONE (OUTPATIENT)
Age: 89
End: 2024-06-14

## 2024-06-14 DIAGNOSIS — I48.19 PERSISTENT ATRIAL FIBRILLATION (HCC): ICD-10-CM

## 2024-06-14 DIAGNOSIS — I48.0 PAROXYSMAL ATRIAL FIBRILLATION (HCC): Primary | ICD-10-CM

## 2024-06-14 DIAGNOSIS — I10 ESSENTIAL HYPERTENSION: ICD-10-CM

## 2024-06-14 DIAGNOSIS — I10 PRIMARY HYPERTENSION: ICD-10-CM

## 2024-06-14 DIAGNOSIS — R30.0 DYSURIA: ICD-10-CM

## 2024-06-14 DIAGNOSIS — I95.1 ORTHOSTATIC HYPOTENSION: ICD-10-CM

## 2024-06-14 DIAGNOSIS — R00.1 SINUS BRADYCARDIA: ICD-10-CM

## 2024-06-14 DIAGNOSIS — N30.01 ACUTE CYSTITIS WITH HEMATURIA: Primary | ICD-10-CM

## 2024-06-14 DIAGNOSIS — N18.32 CKD STAGE 3B, GFR 30-44 ML/MIN (HCC): ICD-10-CM

## 2024-06-14 RX ORDER — CIPROFLOXACIN 250 MG/1
250 TABLET, FILM COATED ORAL 2 TIMES DAILY
Qty: 14 TABLET | Refills: 0 | Status: SHIPPED | OUTPATIENT
Start: 2024-06-14 | End: 2024-06-14 | Stop reason: ALTCHOICE

## 2024-06-14 RX ORDER — CIPROFLOXACIN 250 MG/1
250 TABLET, FILM COATED ORAL 2 TIMES DAILY
Qty: 14 TABLET | Refills: 0 | Status: SHIPPED | OUTPATIENT
Start: 2024-06-14 | End: 2024-06-21

## 2024-06-14 NOTE — TELEPHONE ENCOUNTER
Called [facility] [phone], no answer, they are closed for lunch until 3 pm. I will try again after 3 pm.

## 2024-06-14 NOTE — TELEPHONE ENCOUNTER
----- Message from RHONA Lindo - NP sent at 6/14/2024  8:18 AM EDT -----  Old Rice patient I saw this week, doesn't have PCP  + dysuria and UA shows UTI  Please call him and advise him to begin cipro 250mg BID x 7 days (I sent Rx)  He should have UA/culture rechecked in 2 weeks, order already placed  Advise him to go to [facility] lab to give urine sample in 2 weeks  Please advise him to establish care with a new PCP as well

## 2024-06-14 NOTE — PROGRESS NOTES
UTI, no PCP, will start ciprofloxacin 250mg BID x 7 days  CrCl 32ml/min  Repeat UA/culture in 2 weeks

## 2024-06-14 NOTE — TELEPHONE ENCOUNTER
[facility] pharmacy calling stating the medication (Ciprofloxacin 250MG) she stated that 3 or more of her medications were QTC  prolongzation.    [facility] pharmacy [phone]

## 2024-06-14 NOTE — TELEPHONE ENCOUNTER
Verified patient with two types of identifiers.   Spoke with patient and advised him of UA results and recommendations to start Cipro. Explained need for repeat UA in 2 weeks.   Patient under the impression that we were his PCP office. Advised him that we would send referral for him to establish care.   Patient verbalized understanding and will call with any other questions.

## 2024-06-14 NOTE — TELEPHONE ENCOUNTER
Called [facility] pharmacy @ [phone],spoke with pharmacist. Patient ID verified suing two patient identifiers. She states that [patient came by the pharmacy and confirmed that he is no longer taking any of the medications that were showing up as having interactions with the Cipro. Patient has picked up the Cipro.

## 2024-06-15 LAB
BACTERIA SPEC CULT: ABNORMAL
CC UR VC: ABNORMAL
SERVICE CMNT-IMP: ABNORMAL

## 2024-06-17 ENCOUNTER — OFFICE VISIT (OUTPATIENT)
Age: 89
End: 2024-06-17

## 2024-06-17 VITALS
OXYGEN SATURATION: 100 % | SYSTOLIC BLOOD PRESSURE: 132 MMHG | DIASTOLIC BLOOD PRESSURE: 74 MMHG | RESPIRATION RATE: 16 BRPM | HEART RATE: 84 BPM | TEMPERATURE: 98.6 F | BODY MASS INDEX: 26.83 KG/M2 | WEIGHT: 187 LBS

## 2024-06-17 DIAGNOSIS — R35.0 URINARY FREQUENCY: ICD-10-CM

## 2024-06-17 DIAGNOSIS — N30.01 ACUTE CYSTITIS WITH HEMATURIA: Primary | ICD-10-CM

## 2024-06-17 LAB
BILIRUBIN, URINE, POC: NEGATIVE
BLOOD URINE, POC: ABNORMAL
GLUCOSE URINE, POC: NEGATIVE
KETONES, URINE, POC: ABNORMAL
LEUKOCYTE ESTERASE, URINE, POC: ABNORMAL
NITRITE, URINE, POC: POSITIVE
PH, URINE, POC: 6.5 (ref 4.6–8)
PROTEIN,URINE, POC: ABNORMAL
SPECIFIC GRAVITY, URINE, POC: 1 (ref 1–1.03)
URINALYSIS CLARITY, POC: ABNORMAL
URINALYSIS COLOR, POC: ABNORMAL
UROBILINOGEN, POC: ABNORMAL

## 2024-06-17 NOTE — PATIENT INSTRUCTIONS
Patient was seen today for urinary tract infection  He is not having any symptoms, no fevers, no chills, no flank or back pain, no nausea or vomiting  We did repeat a urinalysis here today it does appear similar to when it was done to 3 days ago  Because the patient is not having any signs or symptoms of infection I do not think it is necessary to send him to the emergency room at this time  We have scheduled the patient with urology, Dr. Navarro on Friday at 1:10 PM  He can be contacted at [phone], address is [address] [place], [address]  It is very important that you closely monitor your symptoms, if you develop any fevers, chills, flank or back pain, nausea or vomiting, or fatigue or lethargy please go immediately to the emergency room where they will want to do IV antibiotics
